# Patient Record
Sex: FEMALE | Race: WHITE | NOT HISPANIC OR LATINO | Employment: FULL TIME | URBAN - METROPOLITAN AREA
[De-identification: names, ages, dates, MRNs, and addresses within clinical notes are randomized per-mention and may not be internally consistent; named-entity substitution may affect disease eponyms.]

---

## 2018-04-03 ENCOUNTER — OFFICE VISIT (OUTPATIENT)
Dept: FAMILY MEDICINE CLINIC | Facility: CLINIC | Age: 43
End: 2018-04-03
Payer: COMMERCIAL

## 2018-04-03 VITALS
HEIGHT: 64 IN | DIASTOLIC BLOOD PRESSURE: 80 MMHG | BODY MASS INDEX: 25.44 KG/M2 | RESPIRATION RATE: 18 BRPM | SYSTOLIC BLOOD PRESSURE: 140 MMHG | HEART RATE: 94 BPM | TEMPERATURE: 98.8 F | WEIGHT: 149 LBS | OXYGEN SATURATION: 95 %

## 2018-04-03 DIAGNOSIS — J02.9 PHARYNGITIS, UNSPECIFIED ETIOLOGY: Primary | ICD-10-CM

## 2018-04-03 PROBLEM — E11.40 NEUROPATHY, DIABETIC (HCC): Status: ACTIVE | Noted: 2018-04-03

## 2018-04-03 PROBLEM — E78.5 HYPERLIPIDEMIA ASSOCIATED WITH TYPE 2 DIABETES MELLITUS (HCC): Status: ACTIVE | Noted: 2018-04-03

## 2018-04-03 PROBLEM — E03.9 ACQUIRED HYPOTHYROIDISM: Status: ACTIVE | Noted: 2018-04-03

## 2018-04-03 PROBLEM — E11.69 HYPERLIPIDEMIA ASSOCIATED WITH TYPE 2 DIABETES MELLITUS (HCC): Status: ACTIVE | Noted: 2018-04-03

## 2018-04-03 PROBLEM — E10.49 TYPE 1 DIABETES MELLITUS WITH NEUROLOGICAL MANIFESTATIONS (HCC): Status: ACTIVE | Noted: 2018-04-03

## 2018-04-03 PROCEDURE — 99213 OFFICE O/P EST LOW 20 MIN: CPT | Performed by: FAMILY MEDICINE

## 2018-04-03 PROCEDURE — 3008F BODY MASS INDEX DOCD: CPT | Performed by: FAMILY MEDICINE

## 2018-04-03 PROCEDURE — 1036F TOBACCO NON-USER: CPT | Performed by: FAMILY MEDICINE

## 2018-04-03 RX ORDER — AMOXICILLIN 500 MG/1
500 CAPSULE ORAL 3 TIMES DAILY
Qty: 30 CAPSULE | Refills: 0 | Status: SHIPPED | OUTPATIENT
Start: 2018-04-03 | End: 2018-04-13

## 2018-04-03 RX ORDER — AMITRIPTYLINE HYDROCHLORIDE 25 MG/1
25 TABLET, FILM COATED ORAL 3 TIMES DAILY
COMMUNITY
Start: 2018-01-08

## 2018-04-03 RX ORDER — BLOOD SUGAR DIAGNOSTIC
STRIP MISCELLANEOUS
COMMUNITY
Start: 2018-02-27

## 2018-04-03 RX ORDER — SIMVASTATIN 5 MG
TABLET ORAL
COMMUNITY
Start: 2018-01-08

## 2018-04-03 RX ORDER — PRAMLINTIDE ACETATE 1000 UG/ML
INJECTION SUBCUTANEOUS
COMMUNITY
Start: 2018-01-08 | End: 2022-01-19

## 2018-04-03 RX ORDER — LEVOTHYROXINE SODIUM 75 MCG
TABLET ORAL
COMMUNITY
Start: 2018-01-08

## 2018-04-03 NOTE — PATIENT INSTRUCTIONS
Send throat swab for rapid strep testing to [facility]   Rx for amoxicillin which can be started today or wait to results of test   Use Tylenol or ibuprofen for pain and fever

## 2018-04-03 NOTE — PROGRESS NOTES
Assessment/Plan:    No problem-specific Assessment & Plan notes found for this encounter  Diagnoses and all orders for this visit:    Pharyngitis, unspecified etiology  -     Rapid Beta strep screen  -     amoxicillin (AMOXIL) 500 mg capsule; Take 1 capsule (500 mg total) by mouth 3 (three) times a day for 10 days please complete entire course of the antibiotic    Other orders  -     simvastatin (ZOCOR) 5 MG tablet;   -     SYMLINPEN 60 1500 MCG/1 5ML SOPN;   -     SYNTHROID 75 MCG tablet;   -     amitriptyline (ELAVIL) 25 mg tablet; Take 25 mg by mouth 3 (three) times a day  -     ONETOUCH VERIO test strip;   -     insulin glargine (LANTUS SOLOSTAR) injection pen 100 units/mL; Inject 0 2 mL (20 Units total) under the skin daily  -     insulin aspart (NovoLOG) 100 units/mL injection; Inject 5 Units under the skin 3 (three) times a day before meals        Patient Instructions   Send throat swab for rapid strep testing to [facility]   Rx for amoxicillin which can be started today or wait to results of test   Use Tylenol or ibuprofen for pain and fever  Subjective:   Chief Complaint   Patient presents with    Sore Throat     cough and congestion, elevated sugars          Patient ID: Alisa Christian is a 43 y o  female  Patient the ill over the last 2-3 days  Chief complaint is or sore throat  She has felt fevers but no documented fevers  Sugars have been elevated since she has been ill  Does have a history of strep throat and diabetes  Sore Throat    Associated symptoms include trouble swallowing  Pertinent negatives include no abdominal pain, coughing, diarrhea, ear pain, shortness of breath or vomiting         The following portions of the patient's history were reviewed and updated as appropriate: allergies, current medications, past family history, past medical history, past social history and problem list     Review of Systems   Constitutional: Negative for appetite change, chills, diaphoresis and fever  HENT: Positive for sore throat, trouble swallowing and voice change  Negative for ear pain, rhinorrhea and sinus pressure  Eyes: Negative for discharge, redness and itching  Respiratory: Negative for cough, shortness of breath and wheezing  Cardiovascular: Negative for chest pain and palpitations  Rapid or slow heart rate   Gastrointestinal: Negative for abdominal pain, diarrhea, nausea and vomiting  Objective:      /80 (BP Location: Left arm, Patient Position: Sitting, Cuff Size: Standard)   Pulse 94   Temp 98 8 °F (37 1 °C) (Oral)   Resp 18   Ht 5' 4" (1 626 m)   Wt 67 6 kg (149 lb)   SpO2 95%   BMI 25 58 kg/m²          Physical Exam   Constitutional: She appears well-developed and well-nourished  No distress  HENT:   Head: Normocephalic and atraumatic  Right Ear: Tympanic membrane and external ear normal  No drainage  Left Ear: Tympanic membrane normal  There is drainage  Nose: Rhinorrhea present  Right sinus exhibits no maxillary sinus tenderness  Left sinus exhibits no maxillary sinus tenderness  Mouth/Throat: Posterior oropharyngeal erythema present  No oropharyngeal exudate  Eyes: Conjunctivae and EOM are normal  Right eye exhibits no discharge  Left eye exhibits no discharge  Neck: Normal range of motion  Neck supple  No thyromegaly present  Cardiovascular: Normal rate, regular rhythm and normal heart sounds  Pulmonary/Chest: Effort normal  No respiratory distress  She has no wheezes  She has no rales  Abdominal: Soft  Lymphadenopathy:     She has no cervical adenopathy  Skin: Skin is warm and dry

## 2018-04-04 ENCOUNTER — TELEPHONE (OUTPATIENT)
Dept: FAMILY MEDICINE CLINIC | Facility: CLINIC | Age: 43
End: 2018-04-04

## 2018-04-05 LAB — Lab: NORMAL

## 2021-04-29 LAB
LEFT EYE DIABETIC RETINOPATHY: NORMAL
RIGHT EYE DIABETIC RETINOPATHY: NORMAL

## 2022-01-03 ENCOUNTER — TELEPHONE (OUTPATIENT)
Dept: FAMILY MEDICINE CLINIC | Facility: CLINIC | Age: 47
End: 2022-01-03

## 2022-01-03 NOTE — TELEPHONE ENCOUNTER
Dolores Samuels Patient  Member ID  [id number]    Date of Birth  [id number]    Gender  Female    Relationship to [address] Name  Judson Yañez    Eligibility Status  Active Coverage    Group Number  [id number]    Plan / Coverage Date  2021-01-01    Transaction Type  Referral    Organization  [facility]    Payer  Melissa Fung ([address])    [employer] logo  Certificate Information  Certification Number  [id number]    Status  CERTIFIED IN TOTAL    Expiration Date  2023-01-03    Member Information  Patient Name  Dolores Samuels    Patient Date of Birth  [id number]    Member ID  [id number]    Relationship to Subscriber  Other Relationship    Subscriber Name  [place], [place]    Requesting Provider     Name  David Campa  [id number]    Provider Role  Provider    Address  [address]  6 Visits  Diagnosis Code 1   - Other general symptoms and signs    Procedure Code 1 (CPT/HCPCS)  43[address]    Status  CERTIFIED IN TOTAL    Rendering Provider/Facility     Provider  Name  [facility]    NPI  [id number]    Provider Role  Service Provider

## 2022-01-19 ENCOUNTER — OFFICE VISIT (OUTPATIENT)
Dept: FAMILY MEDICINE CLINIC | Facility: CLINIC | Age: 47
End: 2022-01-19
Payer: COMMERCIAL

## 2022-01-19 VITALS
DIASTOLIC BLOOD PRESSURE: 88 MMHG | HEART RATE: 85 BPM | BODY MASS INDEX: 21.79 KG/M2 | SYSTOLIC BLOOD PRESSURE: 138 MMHG | WEIGHT: 123 LBS | RESPIRATION RATE: 16 BRPM | HEIGHT: 63 IN | TEMPERATURE: 98.7 F

## 2022-01-19 DIAGNOSIS — Z12.31 ENCOUNTER FOR SCREENING MAMMOGRAM FOR BREAST CANCER: Primary | ICD-10-CM

## 2022-01-19 DIAGNOSIS — Z00.00 WELLNESS EXAMINATION: ICD-10-CM

## 2022-01-19 DIAGNOSIS — E03.9 ACQUIRED HYPOTHYROIDISM: ICD-10-CM

## 2022-01-19 DIAGNOSIS — E10.40 TYPE 1 DIABETES MELLITUS WITH DIABETIC NEUROPATHY (HCC): ICD-10-CM

## 2022-01-19 PROCEDURE — 3008F BODY MASS INDEX DOCD: CPT | Performed by: FAMILY MEDICINE

## 2022-01-19 PROCEDURE — 99386 PREV VISIT NEW AGE 40-64: CPT | Performed by: FAMILY MEDICINE

## 2022-01-19 PROCEDURE — 3725F SCREEN DEPRESSION PERFORMED: CPT | Performed by: FAMILY MEDICINE

## 2022-01-19 PROCEDURE — 1036F TOBACCO NON-USER: CPT | Performed by: FAMILY MEDICINE

## 2022-01-19 RX ORDER — SEMAGLUTIDE 1.34 MG/ML
INJECTION, SOLUTION SUBCUTANEOUS
COMMUNITY
Start: 2022-01-03

## 2022-01-19 RX ORDER — INSULIN ADMIN. SUPPLIES
INSULIN PEN (EA) SUBCUTANEOUS
COMMUNITY
Start: 2022-01-10

## 2022-01-19 RX ORDER — BLOOD-GLUCOSE TRANSMITTER
EACH MISCELLANEOUS
COMMUNITY
Start: 2022-01-10

## 2022-01-19 RX ORDER — BLOOD-GLUCOSE SENSOR
EACH MISCELLANEOUS
COMMUNITY
Start: 2022-01-10

## 2022-01-19 NOTE — PROGRESS NOTES
HPI:  Flor Elizalde is a 55 y o  female here for her yearly health maintenance exam    Patient Active Problem List   Diagnosis    Type 1 diabetes mellitus with neurological manifestations ([facility] )    Neuropathy, diabetic ([facility] )    Hyperlipidemia associated with type 2 diabetes mellitus ([facility] )    Acquired hypothyroidism     History reviewed  No pertinent past medical history  1  Advanced Directive: n     2  Durable Power of  for Healthcare: n     3  Social History:           Drug and alcohol History: n                  4  Immunizations up to date: y                 Lifestyle:                           Healthy Diet:y                          Alcohol Use:n                          Tobacco Use:n                          Regular exercise:y                          Weight concerns:n                               5  Over the past 2 weeks, how often have you been bothered by the following:              Little interest or pleasure in doing things:n              Felling down, depressed or hopeless:n       Current Outpatient Medications   Medication Sig Dispense Refill    amitriptyline (ELAVIL) 25 mg tablet Take 25 mg by mouth 3 (three) times a day      Continuous Blood Gluc Sensor (Dexcom G6 Sensor) MISC       Continuous Blood Gluc Transmit (Dexcom G6 Transmitter) MISC       insulin aspart (NovoLOG) 100 units/mL injection Inject 5 Units under the skin 3 (three) times a day before meals 3 Units 0    insulin glargine (LANTUS SOLOSTAR) injection pen 100 units/mL Inject 0 2 mL (20 Units total) under the skin daily 5 pen 0    NovoPen Echo JEANNETTE       ONETOUCH VERIO test strip       Ozempic, 1 MG/DOSE, 4 MG/3ML SOPN injection pen       simvastatin (ZOCOR) 5 MG tablet       SYMLINPEN 60 1500 MCG/1 5ML SOPN       SYNTHROID 75 MCG tablet        No current facility-administered medications for this visit       No Known Allergies  Immunization History   Administered Date(s) Administered    COVID-19 [employer]) vaccine 0 5 mL 11/14/2021       Patient Care Team:  Mychal Gonzalez MD as PCP - General (Family Medicine)    Review of Systems   Constitutional: Negative for fatigue, fever and unexpected weight change  HENT: Negative for congestion, sinus pain and sore throat  Eyes: Negative for visual disturbance  Respiratory: Negative for shortness of breath and wheezing  Cardiovascular: Negative for chest pain and palpitations  Gastrointestinal: Negative for abdominal pain, nausea and vomiting  Musculoskeletal: Negative  Negative for arthralgias and myalgias  Neurological: Negative for syncope, weakness and numbness  Psychiatric/Behavioral: Negative  Negative for confusion, dysphoric mood and suicidal ideas   diab  Physical Exam :  Physical Exam  Constitutional:       Appearance: She is well-developed  HENT:      Right Ear: Ear canal normal  Tympanic membrane is not injected  Left Ear: Ear canal normal  Tympanic membrane is not injected  Nose: Nose normal    Eyes:      General:         Right eye: No discharge  Left eye: No discharge  Conjunctiva/sclera: Conjunctivae normal       Pupils: Pupils are equal, round, and reactive to light  Neck:      Thyroid: No thyromegaly  Cardiovascular:      Rate and Rhythm: Normal rate and regular rhythm  Pulses: no weak pulses          Dorsalis pedis pulses are 2+ on the right side and 2+ on the left side  Posterior tibial pulses are 2+ on the right side and 2+ on the left side  Heart sounds: Normal heart sounds  No murmur heard  Pulmonary:      Effort: Pulmonary effort is normal  No respiratory distress  Breath sounds: Normal breath sounds  No wheezing  Abdominal:      General: Bowel sounds are normal  There is no distension  Palpations: Abdomen is soft  Tenderness: There is no abdominal tenderness  Musculoskeletal:         General: Normal range of motion        Cervical back: Normal range of motion and neck supple  Feet:      Right foot:      Skin integrity: No ulcer, skin breakdown, erythema, warmth, callus or dry skin  Left foot:      Skin integrity: No ulcer, skin breakdown, erythema, warmth, callus or dry skin  Lymphadenopathy:      Cervical: No cervical adenopathy  Skin:     General: Skin is warm and dry  Neurological:      Mental Status: She is alert and oriented to person, place, and time  She is not disoriented  Sensory: No sensory deficit  Gait: Gait normal       Deep Tendon Reflexes: Reflexes are normal and symmetric  Psychiatric:         Speech: Speech normal          Behavior: Behavior normal          Thought Content: Thought content normal          Judgment: Judgment normal        Patient's shoes and socks removed  Right Foot/Ankle   Right Foot Inspection  Skin Exam: skin normal and skin intact  No dry skin, no warmth, no callus, no erythema, no maceration, no abnormal color, no pre-ulcer, no ulcer and no callus  Toe Exam: ROM and strength within normal limits  Sensory   Vibration: intact  Proprioception: intact  Monofilament testing: intact    Vascular  Capillary refills: < 3 seconds  The right DP pulse is 2+  The right PT pulse is 2+  Left Foot/Ankle  Left Foot Inspection  Skin Exam: skin normal and skin intact  No dry skin, no warmth, no erythema, no maceration, normal color, no pre-ulcer, no ulcer and no callus  Toe Exam: ROM and strength within normal limits  Sensory   Vibration: intact  Proprioception: intact  Monofilament testing: intact    Vascular  Capillary refills: < 3 seconds  The left DP pulse is 2+  The left PT pulse is 2+  Assign Risk Category  No deformity present  No loss of protective sensation  No weak pulses  Risk: 0        Assessment and Plan:  1  Encounter for screening mammogram for breast cancer  Mammo screening bilateral w 3d & cad   2  Wellness examination     3  Type 1 diabetes mellitus with diabetic neuropathy (HCC)     4  Acquired hypothyroidism         Health Maintenance Due   Topic Date Due    Hepatitis C Screening  Never done    Pneumococcal Vaccine: Pediatrics (0 to 5 Years) and At-Risk Patients (6 to 59 Years) (1 of 2 - PPSV23) Never done    Diabetic Foot Exam  Never done    DM Eye Exam  Never done    URINE MICROALBUMIN  Never done    HIV Screening  Never done    Annual Physical  Never done    DTaP,Tdap,and Td Vaccines (1 - Tdap) Never done    Cervical Cancer Screening  Never done    Breast Cancer Screening: Mammogram  Never done    Influenza Vaccine (1) Never done    COVID-19 Vaccine (2 - Booster for Corhythm series) 01/09/2022

## 2022-07-20 ENCOUNTER — OFFICE VISIT (OUTPATIENT)
Dept: FAMILY MEDICINE CLINIC | Facility: CLINIC | Age: 47
End: 2022-07-20
Payer: COMMERCIAL

## 2022-07-20 VITALS
DIASTOLIC BLOOD PRESSURE: 72 MMHG | TEMPERATURE: 98.2 F | WEIGHT: 116 LBS | BODY MASS INDEX: 20.55 KG/M2 | HEART RATE: 86 BPM | SYSTOLIC BLOOD PRESSURE: 120 MMHG | HEIGHT: 63 IN

## 2022-07-20 DIAGNOSIS — M25.571 RIGHT ANKLE PAIN, UNSPECIFIED CHRONICITY: Primary | ICD-10-CM

## 2022-07-20 PROCEDURE — 3725F SCREEN DEPRESSION PERFORMED: CPT | Performed by: FAMILY MEDICINE

## 2022-07-20 PROCEDURE — 99214 OFFICE O/P EST MOD 30 MIN: CPT | Performed by: FAMILY MEDICINE

## 2022-07-20 NOTE — PROGRESS NOTES
Assessment/Plan:    No problem-specific Assessment & Plan notes found for this encounter  Diagnoses and all orders for this visit:    Right ankle pain, unspecified chronicity          Subjective:   Chief Complaint   Patient presents with    Ankle Pain     Right          Patient ID: Tomasa Munguia is a 52 y o  female  R ankle pain s/p Fx 20 yrs ago    Ankle Pain   Pertinent negatives include no numbness  The following portions of the patient's history were reviewed and updated as appropriate: allergies, current medications, past family history, past medical history, past social history, past surgical history and problem list     Review of Systems   Constitutional: Negative for fatigue, fever and unexpected weight change  HENT: Negative for congestion, sinus pain and sore throat  Eyes: Negative for visual disturbance  Respiratory: Negative for shortness of breath and wheezing  Cardiovascular: Negative for chest pain and palpitations  Gastrointestinal: Negative for abdominal pain, nausea and vomiting  Musculoskeletal: Negative  Negative for arthralgias and myalgias  Neurological: Negative for syncope, weakness and numbness  Psychiatric/Behavioral: Negative  Negative for confusion, dysphoric mood and suicidal ideas  Objective:  Vitals:    07/20/22 1420   BP: 120/72   BP Location: Left arm   Patient Position: Sitting   Cuff Size: Adult   Pulse: 86   Temp: 98 2 °F (36 8 °C)   TempSrc: Oral   Weight: 52 6 kg (116 lb)   Height: 5' 3" (1 6 m)      Physical Exam  Constitutional:       Appearance: She is well-developed  HENT:      Right Ear: Ear canal normal  Tympanic membrane is not injected  Left Ear: Ear canal normal  Tympanic membrane is not injected  Nose: Nose normal    Eyes:      General:         Right eye: No discharge  Left eye: No discharge  Conjunctiva/sclera: Conjunctivae normal       Pupils: Pupils are equal, round, and reactive to light     Neck: Thyroid: No thyromegaly  Cardiovascular:      Rate and Rhythm: Normal rate and regular rhythm  Heart sounds: Normal heart sounds  No murmur heard  Pulmonary:      Effort: Pulmonary effort is normal  No respiratory distress  Breath sounds: Normal breath sounds  No wheezing  Abdominal:      General: Bowel sounds are normal  There is no distension  Palpations: Abdomen is soft  Tenderness: There is no abdominal tenderness  Musculoskeletal:         General: Normal range of motion  Cervical back: Normal range of motion and neck supple  Lymphadenopathy:      Cervical: No cervical adenopathy  Skin:     General: Skin is warm and dry  Neurological:      Mental Status: She is alert and oriented to person, place, and time  She is not disoriented  Sensory: No sensory deficit  Gait: Gait normal       Deep Tendon Reflexes: Reflexes are normal and symmetric  Psychiatric:         Speech: Speech normal          Behavior: Behavior normal          Thought Content:  Thought content normal          Judgment: Judgment normal

## 2022-10-03 ENCOUNTER — OFFICE VISIT (OUTPATIENT)
Dept: URGENT CARE | Facility: CLINIC | Age: 47
End: 2022-10-03
Payer: COMMERCIAL

## 2022-10-03 VITALS
RESPIRATION RATE: 18 BRPM | TEMPERATURE: 97.5 F | HEART RATE: 101 BPM | DIASTOLIC BLOOD PRESSURE: 77 MMHG | BODY MASS INDEX: 20.89 KG/M2 | OXYGEN SATURATION: 98 % | SYSTOLIC BLOOD PRESSURE: 150 MMHG | WEIGHT: 117.9 LBS | HEIGHT: 63 IN

## 2022-10-03 DIAGNOSIS — S01.81XA CHIN LACERATION, INITIAL ENCOUNTER: Primary | ICD-10-CM

## 2022-10-03 PROCEDURE — 12011 RPR F/E/E/N/L/M 2.5 CM/<: CPT | Performed by: PHYSICIAN ASSISTANT

## 2022-10-03 PROCEDURE — 90471 IMMUNIZATION ADMIN: CPT | Performed by: PHYSICIAN ASSISTANT

## 2022-10-03 PROCEDURE — G0382 LEV 3 HOSP TYPE B ED VISIT: HCPCS

## 2022-10-03 PROCEDURE — 90715 TDAP VACCINE 7 YRS/> IM: CPT

## 2022-10-03 RX ORDER — GINSENG 100 MG
1 CAPSULE ORAL ONCE
Status: COMPLETED | OUTPATIENT
Start: 2022-10-03 | End: 2022-10-03

## 2022-10-03 RX ORDER — CEPHALEXIN 500 MG/1
500 CAPSULE ORAL EVERY 8 HOURS SCHEDULED
Qty: 15 CAPSULE | Refills: 0 | Status: SHIPPED | OUTPATIENT
Start: 2022-10-03 | End: 2022-10-08

## 2022-10-03 RX ADMIN — Medication 1 SMALL APPLICATION: at 13:20

## 2022-10-03 NOTE — PROGRESS NOTES
NAME: Jerri Mario is a 52 y o  female  : 1975    MRN: [id number]      Assessment and Plan   Chin laceration, initial encounter Lenin Liz  1  Chin laceration, initial encounter  Laceration repair    Tdap Vaccine greater than or equal to 6yo    cephalexin (KEFLEX) 500 mg capsule    bacitracin topical ointment 1 small application      laceration repaired-discussed with patient small area of avulsion as well and that area is not repairable  Discussed wound care, will place on Keflex given type 1 diabetes and suture removal in 5-7 days  Follow-up sooner for signs of infection  She acknowledges    Tetanus updated in clinic      Patient Instructions     Patient Instructions   Keflex as directed  Tetanus updated today in clinic  Keep clean and dry   Antibiotic ointment daily   Covered while out and about- air out at home   Follow up for signs of infection   Suture removal in 5-7 days         Proceed to ER if symptoms worsen  Chief Complaint     Chief Complaint   Patient presents with    chin laceration     Pt reports falling and landing chin-first on concrete walkway at [employer]-denies fainting/LOC/dizziness before/during/after fall-says she tripped-not on blood thinners- last tet unknown-3/10 achey pain-is covid pos x 5d         History of Present Illness   Patient with history of type 1 diabetes presents with has been complaining of laceration to her chin sustained prior to arrival   Reports she was walking and caught her foot on uneven pavement and fell forward  Reports her hands were in her pockets and only able to catch herself at the last second, hitting her chin on the curb  Denies loss of consciousness but does have a small laceration to her chin  Denies any vision changes, dizziness, lightheadedness, vomiting, neck pain or stiffness  Reports minimal pain in the area and states I feel like I just have a scrape    Unsure of when her last tetanus shot was        Review of Systems Review of Systems   Constitutional: Negative for chills and fever  Eyes: Negative for photophobia and visual disturbance  Gastrointestinal: Negative for nausea and vomiting  Musculoskeletal: Negative for neck pain and neck stiffness  Skin: Positive for wound  Neurological: Negative for dizziness, tremors, seizures, syncope, weakness, light-headedness, numbness and headaches  Current Medications       Current Outpatient Medications:     amitriptyline (ELAVIL) 25 mg tablet, Take 25 mg by mouth 3 (three) times a day, Disp: , Rfl:     cephalexin (KEFLEX) 500 mg capsule, Take 1 capsule (500 mg total) by mouth every 8 (eight) hours for 5 days, Disp: 15 capsule, Rfl: 0    Continuous Blood Gluc Sensor (Dexcom G6 Sensor) MISC, , Disp: , Rfl:     Continuous Blood Gluc Transmit (Dexcom G6 Transmitter) MISC, , Disp: , Rfl:     insulin aspart (NovoLOG) 100 units/mL injection, Inject 5 Units under the skin 3 (three) times a day before meals, Disp: 3 Units, Rfl: 0    insulin glargine (LANTUS SOLOSTAR) injection pen 100 units/mL, Inject 0 2 mL (20 Units total) under the skin daily, Disp: 5 pen, Rfl: 0    NovoPen Echo JEANNETTE, , Disp: , Rfl:     ONETOUCH VERIO test strip, , Disp: , Rfl:     Ozempic, 1 MG/DOSE, 4 MG/3ML SOPN injection pen, , Disp: , Rfl:     simvastatin (ZOCOR) 5 MG tablet, , Disp: , Rfl:     SYNTHROID 75 MCG tablet, , Disp: , Rfl:     Current Facility-Administered Medications:     bacitracin topical ointment 1 small application, 1 small application, Topical, Once, Ana Bar PA-C    Current Allergies     Allergies as of 10/03/2022    (No Known Allergies)              Past Medical History:   Diagnosis Date    Diabetes mellitus ([facility] [address] )        History reviewed  No pertinent surgical history      Family History   Problem Relation Age of Onset    Hypertension Maternal Grandmother     Diabetes Cousin     Substance Abuse Neg Hx     Mental illness Neg Hx          Medications have been verified  The following portions of the patient's history were reviewed and updated as appropriate: allergies, current medications, past family history, past medical history, past social history, past surgical history and problem list     Objective   /77   Pulse 101   Temp 97 5 °F (36 4 °C)   Resp 18   Ht 5' 3" (1 6 m)   Wt 53 5 kg (117 lb 14 4 oz)   SpO2 98%   BMI 20 89 kg/m²      Laceration repair    Date/Time: 10/3/2022 12:26 PM  Performed by: Paco Hanonn PA-C  Authorized by: Paco Hannon PA-C   Consent: Verbal consent obtained  Risks and benefits: risks, benefits and alternatives were discussed  Consent given by: patient  Patient understanding: patient states understanding of the procedure being performed  Patient identity confirmed: verbally with patient  Time out: Immediately prior to procedure a "time out" was called to verify the correct patient, procedure, equipment, support staff and site/side marked as required  Body area: head/neck  Location details: chin  Laceration length: 1 5 cm  Foreign bodies: no foreign bodies  Anesthesia: local infiltration    Anesthesia:  Local Anesthetic: lidocaine 1% without epinephrine  Anesthetic total: 2 mL      Procedure Details:  Preparation: Patient was prepped and draped in the usual sterile fashion  Irrigation solution: saline  Irrigation method: jet lavage  Amount of cleaning: standard  Debridement: minimal  Skin closure: Ethilon (5-0)  Number of sutures: 3  Technique: simple  Approximation: close  Approximation difficulty: simple  Dressing: antibiotic ointment  Patient tolerance: patient tolerated the procedure well with no immediate complications           Physical Exam     Physical Exam  Vitals and nursing note reviewed  Constitutional:       General: She is not in acute distress  Appearance: Normal appearance  She is not ill-appearing, toxic-appearing or diaphoretic  HENT:      Head:      Comments: Teeth are intact without any damage  No bleeding mouth  Eyes:      Extraocular Movements: Extraocular movements intact  Pupils: Pupils are equal, round, and reactive to light  Neck:      Comments: No tenderness in the midline of the bilateral paravertebral muscles  Full AROM without pain or restriction  Cardiovascular:      Rate and Rhythm: Normal rate and regular rhythm  Pulmonary:      Effort: Pulmonary effort is normal  No respiratory distress  Musculoskeletal:      Cervical back: Normal range of motion  No rigidity or tenderness  Skin:     General: Skin is warm  Capillary Refill: Capillary refill takes less than 2 seconds  Comments: 1 5 cm jagged laceration barely extending through the dermis to the anterior/inferior chin without any visible foreign bodies with adjacent avulsion  No surrounding edema  No active bleeding  No tenderness to palpation  Full open and close the jaw without pain or restriction  Neurological:      General: No focal deficit present  Mental Status: She is alert and oriented to person, place, and time  Motor: No weakness  Psychiatric:         Mood and Affect: Mood normal          Thought Content:  Thought content normal

## 2022-10-03 NOTE — PATIENT INSTRUCTIONS
Keflex as directed  Tetanus updated today in clinic  Keep clean and dry   Antibiotic ointment daily   Covered while out and about- air out at home   Follow up for signs of infection   Suture removal in 5-7 days

## 2022-10-07 ENCOUNTER — OFFICE VISIT (OUTPATIENT)
Dept: FAMILY MEDICINE CLINIC | Facility: CLINIC | Age: 47
End: 2022-10-07
Payer: COMMERCIAL

## 2022-10-07 VITALS
SYSTOLIC BLOOD PRESSURE: 122 MMHG | BODY MASS INDEX: 20.55 KG/M2 | RESPIRATION RATE: 16 BRPM | HEART RATE: 88 BPM | WEIGHT: 116 LBS | OXYGEN SATURATION: 99 % | HEIGHT: 63 IN | DIASTOLIC BLOOD PRESSURE: 80 MMHG

## 2022-10-07 DIAGNOSIS — Z48.02 VISIT FOR SUTURE REMOVAL: Primary | ICD-10-CM

## 2022-10-07 PROCEDURE — 99212 OFFICE O/P EST SF 10 MIN: CPT | Performed by: FAMILY MEDICINE

## 2022-10-07 NOTE — PROGRESS NOTES
[id number]         NAME: Roxana Gruber is a 52 y o  female  : 1975    MRN: [id number]  DATE: 2022  TIME: 3:52 PM    Assessment and Plan   Visit for suture removal [Z48 02]  1  Visit for suture removal         No problem-specific Assessment & Plan notes found for this encounter  Patient Instructions     Patient Instructions   Ready to keep Steri-Strips in place  Careful washing  Finish out antibiotic  Chief Complaint     Chief Complaint   Patient presents with    Suture / Staple Removal         History of Present Illness       PATIENT HERE FOR SUTURE REMOVAL  Laceration of the chin  3 sutures visible  There were placed 10/ 3  Review of Systems   Review of Systems   Skin: Positive for wound  Negative for color change and rash           Current Medications       Current Outpatient Medications:     amitriptyline (ELAVIL) 25 mg tablet, Take 25 mg by mouth 3 (three) times a day, Disp: , Rfl:     cephalexin (KEFLEX) 500 mg capsule, Take 1 capsule (500 mg total) by mouth every 8 (eight) hours for 5 days, Disp: 15 capsule, Rfl: 0    Continuous Blood Gluc Sensor (Dexcom G6 Sensor) MISC, , Disp: , Rfl:     Continuous Blood Gluc Transmit (Dexcom G6 Transmitter) MISC, , Disp: , Rfl:     insulin aspart (NovoLOG) 100 units/mL injection, Inject 5 Units under the skin 3 (three) times a day before meals, Disp: 3 Units, Rfl: 0    insulin glargine (LANTUS SOLOSTAR) injection pen 100 units/mL, Inject 0 2 mL (20 Units total) under the skin daily, Disp: 5 pen, Rfl: 0    NovoPen Echo JEANNETTE, , Disp: , Rfl:     ONETOUCH VERIO test strip, , Disp: , Rfl:     Ozempic, 1 MG/DOSE, 4 MG/3ML SOPN injection pen, , Disp: , Rfl:     simvastatin (ZOCOR) 5 MG tablet, , Disp: , Rfl:     SYNTHROID 75 MCG tablet, , Disp: , Rfl:     Current Allergies     Allergies as of 10/07/2022    (No Known Allergies)            The following portions of the patient's history were reviewed and updated as appropriate: allergies, current medications, past family history, past medical history, past social history, past surgical history and problem list      Past Medical History:   Diagnosis Date    Diabetes mellitus ([facility] [address] )        History reviewed  No pertinent surgical history  Family History   Problem Relation Age of Onset    Hypertension Maternal Grandmother     Diabetes Cousin     Substance Abuse Neg Hx     Mental illness Neg Hx          Medications have been verified  Objective   /80   Pulse 88   Resp 16   Ht 5' 3" (1 6 m)   Wt 52 6 kg (116 lb)   LMP 09/14/2022   SpO2 99%   BMI 20 55 kg/m²        Physical Exam     Physical Exam  Constitutional:       Appearance: Normal appearance  HENT:      Head: Normocephalic  Skin:     Comments: Approximately 1 cm laceration  Some abrasion  Shows good healing  3 sutures removed without issue  Steri-Strips placed fracture support for the next 2-3 days  Neurological:      Mental Status: She is alert

## 2023-02-14 LAB
LEFT EYE DIABETIC RETINOPATHY: NORMAL
RIGHT EYE DIABETIC RETINOPATHY: NORMAL
SEVERITY (EYE EXAM): NORMAL

## 2023-03-01 ENCOUNTER — OFFICE VISIT (OUTPATIENT)
Dept: FAMILY MEDICINE CLINIC | Facility: CLINIC | Age: 48
End: 2023-03-01

## 2023-03-01 VITALS
OXYGEN SATURATION: 100 % | TEMPERATURE: 98.6 F | SYSTOLIC BLOOD PRESSURE: 124 MMHG | DIASTOLIC BLOOD PRESSURE: 71 MMHG | BODY MASS INDEX: 20.73 KG/M2 | WEIGHT: 117 LBS | HEART RATE: 85 BPM

## 2023-03-01 DIAGNOSIS — E10.40 TYPE 1 DIABETES MELLITUS WITH DIABETIC NEUROPATHY (HCC): ICD-10-CM

## 2023-03-01 DIAGNOSIS — Z00.00 ANNUAL PHYSICAL EXAM: ICD-10-CM

## 2023-03-01 DIAGNOSIS — G44.201 ACUTE INTRACTABLE TENSION-TYPE HEADACHE: Primary | ICD-10-CM

## 2023-03-01 DIAGNOSIS — Z12.31 SCREENING MAMMOGRAM, ENCOUNTER FOR: ICD-10-CM

## 2023-03-01 DIAGNOSIS — E78.5 HYPERLIPIDEMIA ASSOCIATED WITH TYPE 2 DIABETES MELLITUS (HCC): ICD-10-CM

## 2023-03-01 DIAGNOSIS — E11.69 HYPERLIPIDEMIA ASSOCIATED WITH TYPE 2 DIABETES MELLITUS (HCC): ICD-10-CM

## 2023-03-01 DIAGNOSIS — E03.9 ACQUIRED HYPOTHYROIDISM: ICD-10-CM

## 2023-03-01 RX ORDER — KETOROLAC TROMETHAMINE 10 MG/1
10 TABLET, FILM COATED ORAL EVERY 6 HOURS PRN
Qty: 12 TABLET | Refills: 0 | Status: SHIPPED | OUTPATIENT
Start: 2023-03-01

## 2023-03-01 NOTE — PATIENT INSTRUCTIONS
Toradol as prescribed for intractable headache  Go to ED if pain severe  Call with problems/concerns    Wellness Visit for Adults   AMBULATORY CARE:   A wellness visit  is when you see your healthcare provider to get screened for health problems  Your healthcare provider will also give you advice on how to stay healthy  Write down your questions so you remember to ask them  Ask your healthcare provider how often you should have a wellness visit  What happens at a wellness visit:  Your healthcare provider will ask about your health, and your family history of health problems  This includes high blood pressure, heart disease, and cancer  He or she will ask if you have symptoms that concern you, if you smoke, and about your mood  You may also be asked about your intake of medicines, supplements, food, and alcohol  Any of the following may be done: Your weight  will be checked  Your height may also be checked so your body mass index (BMI) can be calculated  Your BMI shows if you are at a healthy weight  Your blood pressure  and heart rate will be checked  Your temperature may also be checked  Blood and urine tests  may be done  Blood tests may be done to check your cholesterol levels  Abnormal cholesterol levels increase your risk for heart disease and stroke  You may also need a blood or urine test to check for diabetes if you are at increased risk  Urine tests may be done to look for signs of an infection or kidney disease  A physical exam  includes checking your heartbeat and lungs with a stethoscope  Your healthcare provider may also check your skin to look for sun damage  Screening tests  may be recommended  A screening test is done to check for diseases that may not cause symptoms  The screening tests you may need depend on your age, gender, family history, and lifestyle habits  For example, colorectal screening may be recommended if you are 48years old or older      Screening tests you need if you are a woman:   A Pap smear  is used to screen for cervical cancer  Pap smears are usually done every 3 to 5 years depending on your age  You may need them more often if you have had abnormal Pap smear test results in the past  Ask your healthcare provider how often you should have a Pap smear  A mammogram  is an x-ray of your breasts to screen for breast cancer  Experts recommend mammograms every 2 years starting at age 48 years  You may need a mammogram at age 52 years or younger if you have an increased risk for breast cancer  Talk to your healthcare provider about when you should start having mammograms and how often you need them  Vaccines you may need:   Get an influenza vaccine  every year  The influenza vaccine protects you from the flu  Several types of viruses cause the flu  The viruses change over time, so new vaccines are made each year  Get a tetanus-diphtheria (Td) booster vaccine  every 10 years  This vaccine protects you against tetanus and diphtheria  Tetanus is a severe infection that may cause painful muscle spasms and lockjaw  Diphtheria is a severe bacterial infection that causes a thick covering in the back of your mouth and throat  Get a human papillomavirus (HPV) vaccine  if you are female and aged 23 to 32 or male 23 to 24 and never received it  This vaccine protects you from HPV infection  HPV is the most common infection spread by sexual contact  HPV may also cause vaginal, penile, and anal cancers  Get a pneumococcal vaccine  if you are aged 72 years or older  The pneumococcal vaccine is an injection given to protect you from pneumococcal disease  Pneumococcal disease is an infection caused by pneumococcal bacteria  The infection may cause pneumonia, meningitis, or an ear infection  Get a shingles vaccine  if you are 60 or older, even if you have had shingles before  The shingles vaccine is an injection to protect you from the varicella-zoster virus   This is the same virus that causes chickenpox  Shingles is a painful rash that develops in people who had chickenpox or have been exposed to the virus  How to eat healthy:  My Plate is a model for planning healthy meals  It shows the types and amounts of foods that should go on your plate  Fruits and vegetables make up about half of your plate, and grains and protein make up the other half  A serving of dairy is included on the side of your plate  The amount of calories and serving sizes you need depends on your age, gender, weight, and height  Examples of healthy foods are listed below:  Eat a variety of vegetables  such as dark green, red, and orange vegetables  You can also include canned vegetables low in sodium (salt) and frozen vegetables without added butter or sauces  Eat a variety of fresh fruits , canned fruit in 100% juice, frozen fruit, and dried fruit  Include whole grains  At least half of the grains you eat should be whole grains  Examples include whole-wheat bread, wheat pasta, brown rice, and whole-grain cereals such as oatmeal     Eat a variety of protein foods such as seafood (fish and shellfish), lean meat, and poultry without skin (turkey and chicken)  Examples of lean meats include pork leg, shoulder, or tenderloin, and beef round, sirloin, tenderloin, and extra lean ground beef  Other protein foods include eggs and egg substitutes, beans, peas, soy products, nuts, and seeds  Choose low-fat dairy products such as skim or 1% milk or low-fat yogurt, cheese, and cottage cheese  Limit unhealthy fats  such as butter, hard margarine, and shortening  Exercise:  Exercise at least 30 minutes per day on most days of the week  Some examples of exercise include walking, biking, dancing, and swimming  You can also fit in more physical activity by taking the stairs instead of the elevator or parking farther away from stores  Include muscle strengthening activities 2 days each week   Regular exercise provides many health benefits  It helps you manage your weight, and decreases your risk for type 2 diabetes, heart disease, stroke, and high blood pressure  Exercise can also help improve your mood  Ask your healthcare provider about the best exercise plan for you  General health and safety guidelines:   Do not smoke  Nicotine and other chemicals in cigarettes and cigars can cause lung damage  Ask your healthcare provider for information if you currently smoke and need help to quit  E-cigarettes or smokeless tobacco still contain nicotine  Talk to your healthcare provider before you use these products  Limit alcohol  A drink of alcohol is 12 ounces of beer, 5 ounces of wine, or 1½ ounces of liquor  Lose weight, if needed  Being overweight increases your risk of certain health conditions  These include heart disease, high blood pressure, type 2 diabetes, and certain types of cancer  Protect your skin  Do not sunbathe or use tanning beds  Use sunscreen with a SPF 15 or higher  Apply sunscreen at least 15 minutes before you go outside  Reapply sunscreen every 2 hours  Wear protective clothing, hats, and sunglasses when you are outside  Drive safely  Always wear your seatbelt  Make sure everyone in your car wears a seatbelt  A seatbelt can save your life if you are in an accident  Do not use your cell phone when you are driving  This could distract you and cause an accident  Pull over if you need to make a call or send a text message  Practice safe sex  Use latex condoms if are sexually active and have more than one partner  Your healthcare provider may recommend screening tests for sexually transmitted infections (STIs)  Wear helmets, lifejackets, and protective gear  Always wear a helmet when you ride a bike or motorcycle, go skiing, or play sports that could cause a head injury  Wear protective equipment when you play sports   Wear a lifejacket when you are on a boat or doing water sports  © Copyright [employer] 2022 Information is for End User's use only and may not be sold, redistributed or otherwise used for commercial purposes  The above information is an  only  It is not intended as medical advice for individual conditions or treatments  Talk to your doctor, nurse or pharmacist before following any medical regimen to see if it is safe and effective for you

## 2023-03-01 NOTE — PROGRESS NOTES
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 ([address] )    Hyperlipidemia associated with type 2 diabetes mellitus ([address] )    Acquired hypothyroidism   Other Visit Diagnoses     Acute intractable tension-type headache    -  Primary    Relevant Medications    ketorolac (TORADOL) 10 mg tablet    Screening mammogram, encounter for        Relevant Orders    Mammo screening bilateral w 3d & cad    Annual physical exam              Immunizations and preventive care screenings were discussed with patient today  Appropriate education was printed on patient's after visit summary  Counseling:  Alcohol/drug use: discussed moderation in alcohol intake, the recommendations for healthy alcohol use, and avoidance of illicit drug use  Dental Health: discussed importance of regular tooth brushing, flossing, and dental visits  Injury prevention: discussed safety/seat belts, safety helmets, smoke detectors, carbon dioxide detectors, and smoking near bedding or upholstery  Sexual health: discussed sexually transmitted diseases, partner selection, use of condoms, avoidance of unintended pregnancy, and contraceptive alternatives  · Exercise: the importance of regular exercise/physical activity was discussed  Recommend exercise 3-5 times per week for at least 30 minutes  Depression Screening and Follow-up Plan: Patient was screened for depression during today's encounter  They screened negative with a PHQ-2 score of 0  No follow-ups on file       Chief Complaint:     Chief Complaint   Patient presents with   • Headache     Severe for past couple of days   • Physical Exam      History of Present Illness:     Adult Annual Physical   Patient here for a comprehensive physical exam  The patient reports problems - severe HA  Diet and Physical Activity  · Diet/Nutrition: well balanced diet  · Exercise: no formal exercise  Depression Screening  PHQ-2/9 Depression Screening    Little interest or pleasure in doing things: 0 - not at all  Feeling down, depressed, or hopeless: 0 - not at all  PHQ-2 Score: 0  PHQ-2 Interpretation: Negative depression screen       General Health  · Sleep: sleeps well  · Hearing: normal - bilateral   · Vision: goes for regular eye exams  · Dental: regular dental visits  /GYN Health  · Patient is: perimenopausal  · Last menstrual period: 2/22/23  · Contraceptive method: none  Review of Systems:     Review of Systems   Constitutional: Negative for activity change, appetite change, chills, diaphoresis, fatigue, fever and unexpected weight change  HENT: Negative for congestion, dental problem, drooling, ear discharge, ear pain, facial swelling, hearing loss, mouth sores, nosebleeds, postnasal drip, rhinorrhea, sinus pressure, sinus pain, sneezing, sore throat, tinnitus, trouble swallowing and voice change  Eyes: Negative for photophobia, pain, discharge, redness, itching and visual disturbance  Respiratory: Negative for apnea, cough, choking, chest tightness, shortness of breath, wheezing and stridor  Cardiovascular: Negative for chest pain, palpitations and leg swelling  Gastrointestinal: Negative for abdominal distention, abdominal pain, anal bleeding, blood in stool, constipation, diarrhea, nausea, rectal pain and vomiting  Endocrine: Negative for cold intolerance, heat intolerance, polydipsia, polyphagia and polyuria  Genitourinary: Negative for decreased urine volume, difficulty urinating, dysuria, flank pain, frequency, hematuria, menstrual problem, pelvic pain, urgency, vaginal bleeding, vaginal discharge and vaginal pain     Musculoskeletal: Negative for arthralgias, back pain, gait problem, joint swelling, myalgias, neck pain and neck stiffness  Skin: Negative for color change, pallor, rash and wound  Allergic/Immunologic: Negative for food allergies and immunocompromised state  Neurological: Positive for headaches  Negative for dizziness, tremors, seizures, syncope, facial asymmetry, speech difficulty, weakness, light-headedness and numbness  Hematological: Negative for adenopathy  Does not bruise/bleed easily  Psychiatric/Behavioral: Negative for agitation, behavioral problems, confusion, decreased concentration, dysphoric mood, hallucinations, self-injury, sleep disturbance and suicidal ideas  The patient is not nervous/anxious and is not hyperactive  Past Medical History:     Past Medical History:   Diagnosis Date   • Diabetes mellitus ([facility] [address] )       Past Surgical History:     History reviewed  No pertinent surgical history     Social History:     Social History     Socioeconomic History   • Marital status: Single     Spouse name: None   • Number of children: None   • Years of education: None   • Highest education level: None   Occupational History   • None   Tobacco Use   • Smoking status: Never   • Smokeless tobacco: Never   Vaping Use   • Vaping Use: Never used   Substance and Sexual Activity   • Alcohol use: Yes     Comment: rare   • Drug use: No   • Sexual activity: Yes     Partners: Male   Other Topics Concern   • None   Social History Narrative   • None     Social Determinants of Health     Financial Resource Strain: Not on file   Food Insecurity: Not on file   Transportation Needs: Not on file   Physical Activity: Not on file   Stress: Not on file   Social Connections: Not on file   Intimate Partner Violence: Not on file   Housing Stability: Not on file      Family History:     Family History   Problem Relation Age of Onset   • Hypertension Maternal Grandmother    • Diabetes Cousin    • Substance Abuse Neg Hx    • Mental illness Neg Hx       Current Medications: Current Outpatient Medications   Medication Sig Dispense Refill   • amitriptyline (ELAVIL) 25 mg tablet Take 25 mg by mouth 3 (three) times a day     • Continuous Blood Gluc Sensor (Dexcom G6 Sensor) MISC      • Continuous Blood Gluc Transmit (Dexcom G6 Transmitter) MISC      • insulin aspart (NovoLOG) 100 units/mL injection Inject 5 Units under the skin 3 (three) times a day before meals 3 Units 0   • insulin glargine (LANTUS SOLOSTAR) injection pen 100 units/mL Inject 0 2 mL (20 Units total) under the skin daily 5 pen 0   • ketorolac (TORADOL) 10 mg tablet Take 1 tablet (10 mg total) by mouth every 6 (six) hours as needed for moderate pain 12 tablet 0   • NovoPen Echo JEANNETTE      • ONETOUCH VERIO test strip      • Ozempic, 1 MG/DOSE, 4 MG/3ML SOPN injection pen      • simvastatin (ZOCOR) 5 MG tablet      • SYNTHROID 75 MCG tablet        No current facility-administered medications for this visit  Allergies:     No Known Allergies   Physical Exam:     /71 (BP Location: Left arm, Patient Position: Sitting, Cuff Size: Standard)   Pulse 85   Temp 98 6 °F (37 °C) (Tympanic)   Wt 53 1 kg (117 lb)   SpO2 100%   BMI 20 73 kg/m²     Physical Exam  Vitals and nursing note reviewed  Constitutional:       General: She is not in acute distress  Appearance: Normal appearance  She is normal weight  She is not ill-appearing, toxic-appearing or diaphoretic  HENT:      Head: Normocephalic  Right Ear: Tympanic membrane, ear canal and external ear normal  There is no impacted cerumen  Left Ear: Tympanic membrane, ear canal and external ear normal  There is no impacted cerumen  Nose: Nose normal  No congestion or rhinorrhea  Mouth/Throat:      Mouth: Mucous membranes are moist       Pharynx: Oropharynx is clear  No oropharyngeal exudate or posterior oropharyngeal erythema  Eyes:      General: No scleral icterus  Right eye: No discharge  Left eye: No discharge  Extraocular Movements: Extraocular movements intact  Conjunctiva/sclera: Conjunctivae normal       Pupils: Pupils are equal, round, and reactive to light  Neck:      Vascular: No carotid bruit  Cardiovascular:      Rate and Rhythm: Normal rate and regular rhythm  Pulses: Normal pulses  Heart sounds: Normal heart sounds  No murmur heard  No friction rub  No gallop  Pulmonary:      Effort: Pulmonary effort is normal  No respiratory distress  Breath sounds: Normal breath sounds  No stridor  No wheezing, rhonchi or rales  Chest:      Chest wall: No tenderness  Abdominal:      General: Abdomen is flat  Bowel sounds are normal  There is no distension  Palpations: Abdomen is soft  There is no mass  Tenderness: There is no abdominal tenderness  There is no right CVA tenderness, left CVA tenderness, guarding or rebound  Hernia: No hernia is present  Musculoskeletal:         General: No swelling, tenderness, deformity or signs of injury  Normal range of motion  Cervical back: Normal range of motion and neck supple  No rigidity or tenderness  No muscular tenderness  Right lower leg: No edema  Left lower leg: No edema  Lymphadenopathy:      Cervical: No cervical adenopathy  Skin:     General: Skin is warm  Capillary Refill: Capillary refill takes less than 2 seconds  Coloration: Skin is not jaundiced or pale  Findings: No bruising, erythema, lesion or rash  Neurological:      General: No focal deficit present  Mental Status: She is alert and oriented to person, place, and time  Cranial Nerves: No cranial nerve deficit  Sensory: No sensory deficit  Motor: No weakness  Coordination: Coordination normal       Gait: Gait normal       Deep Tendon Reflexes: Reflexes normal    Psychiatric:         Mood and Affect: Mood normal          Behavior: Behavior normal          Thought Content:  Thought content normal  Judgment: Judgment normal           Angela Dean, [address] [place], [place] [id number]

## 2023-03-01 NOTE — PROGRESS NOTES
[facility]        NAME: Caity Esteban is a 52 y o  female  : 1975    MRN: [id number]  DATE: 2023  TIME: 3:32 PM    Assessment and Plan   Acute intractable tension-type headache [G44 201]  1  Acute intractable tension-type headache  ketorolac (TORADOL) 10 mg tablet      2  Type 1 diabetes mellitus with diabetic neuropathy (HCC)        3  Screening mammogram, encounter for  Mammo screening bilateral w 3d & cad      4  Annual physical exam        5  Acquired hypothyroidism        6  Hyperlipidemia associated with type 2 diabetes mellitus [facility])              Patient Instructions     Patient Instructions     Toradol as prescribed for intractable headache  Go to ED if pain severe  Call with problems/concerns    Wellness Visit for Adults   AMBULATORY CARE:   A wellness visit  is when you see your healthcare provider to get screened for health problems  Your healthcare provider will also give you advice on how to stay healthy  Write down your questions so you remember to ask them  Ask your healthcare provider how often you should have a wellness visit  What happens at a wellness visit:  Your healthcare provider will ask about your health, and your family history of health problems  This includes high blood pressure, heart disease, and cancer  He or she will ask if you have symptoms that concern you, if you smoke, and about your mood  You may also be asked about your intake of medicines, supplements, food, and alcohol  Any of the following may be done:  • Your weight  will be checked  Your height may also be checked so your body mass index (BMI) can be calculated  Your BMI shows if you are at a healthy weight  • Your blood pressure  and heart rate will be checked  Your temperature may also be checked  • Blood and urine tests  may be done  Blood tests may be done to check your cholesterol levels  Abnormal cholesterol levels increase your risk for heart disease and stroke   You may also need a blood or urine test to check for diabetes if you are at increased risk  Urine tests may be done to look for signs of an infection or kidney disease  • A physical exam  includes checking your heartbeat and lungs with a stethoscope  Your healthcare provider may also check your skin to look for sun damage  • Screening tests  may be recommended  A screening test is done to check for diseases that may not cause symptoms  The screening tests you may need depend on your age, gender, family history, and lifestyle habits  For example, colorectal screening may be recommended if you are 48years old or older  Screening tests you need if you are a woman:   • A Pap smear  is used to screen for cervical cancer  Pap smears are usually done every 3 to 5 years depending on your age  You may need them more often if you have had abnormal Pap smear test results in the past  Ask your healthcare provider how often you should have a Pap smear  • A mammogram  is an x-ray of your breasts to screen for breast cancer  Experts recommend mammograms every 2 years starting at age 48 years  You may need a mammogram at age 52 years or younger if you have an increased risk for breast cancer  Talk to your healthcare provider about when you should start having mammograms and how often you need them  Vaccines you may need:   • Get an influenza vaccine  every year  The influenza vaccine protects you from the flu  Several types of viruses cause the flu  The viruses change over time, so new vaccines are made each year  • Get a tetanus-diphtheria (Td) booster vaccine  every 10 years  This vaccine protects you against tetanus and diphtheria  Tetanus is a severe infection that may cause painful muscle spasms and lockjaw  Diphtheria is a severe bacterial infection that causes a thick covering in the back of your mouth and throat      • Get a human papillomavirus (HPV) vaccine  if you are female and aged 23 to 32 or male 23 to 21 and never received it  This vaccine protects you from HPV infection  HPV is the most common infection spread by sexual contact  HPV may also cause vaginal, penile, and anal cancers  • Get a pneumococcal vaccine  if you are aged 72 years or older  The pneumococcal vaccine is an injection given to protect you from pneumococcal disease  Pneumococcal disease is an infection caused by pneumococcal bacteria  The infection may cause pneumonia, meningitis, or an ear infection  • Get a shingles vaccine  if you are 60 or older, even if you have had shingles before  The shingles vaccine is an injection to protect you from the varicella-zoster virus  This is the same virus that causes chickenpox  Shingles is a painful rash that develops in people who had chickenpox or have been exposed to the virus  How to eat healthy:  My Plate is a model for planning healthy meals  It shows the types and amounts of foods that should go on your plate  Fruits and vegetables make up about half of your plate, and grains and protein make up the other half  A serving of dairy is included on the side of your plate  The amount of calories and serving sizes you need depends on your age, gender, weight, and height  Examples of healthy foods are listed below:  • Eat a variety of vegetables  such as dark green, red, and orange vegetables  You can also include canned vegetables low in sodium (salt) and frozen vegetables without added butter or sauces  • Eat a variety of fresh fruits , canned fruit in 100% juice, frozen fruit, and dried fruit  • Include whole grains  At least half of the grains you eat should be whole grains  Examples include whole-wheat bread, wheat pasta, brown rice, and whole-grain cereals such as oatmeal     • Eat a variety of protein foods such as seafood (fish and shellfish), lean meat, and poultry without skin (turkey and chicken)   Examples of lean meats include pork leg, shoulder, or tenderloin, and beef round, sirloin, tenderloin, and extra lean ground beef  Other protein foods include eggs and egg substitutes, beans, peas, soy products, nuts, and seeds  • Choose low-fat dairy products such as skim or 1% milk or low-fat yogurt, cheese, and cottage cheese  • Limit unhealthy fats  such as butter, hard margarine, and shortening  Exercise:  Exercise at least 30 minutes per day on most days of the week  Some examples of exercise include walking, biking, dancing, and swimming  You can also fit in more physical activity by taking the stairs instead of the elevator or parking farther away from stores  Include muscle strengthening activities 2 days each week  Regular exercise provides many health benefits  It helps you manage your weight, and decreases your risk for type 2 diabetes, heart disease, stroke, and high blood pressure  Exercise can also help improve your mood  Ask your healthcare provider about the best exercise plan for you  General health and safety guidelines:   • Do not smoke  Nicotine and other chemicals in cigarettes and cigars can cause lung damage  Ask your healthcare provider for information if you currently smoke and need help to quit  E-cigarettes or smokeless tobacco still contain nicotine  Talk to your healthcare provider before you use these products  • Limit alcohol  A drink of alcohol is 12 ounces of beer, 5 ounces of wine, or 1½ ounces of liquor  • Lose weight, if needed  Being overweight increases your risk of certain health conditions  These include heart disease, high blood pressure, type 2 diabetes, and certain types of cancer  • Protect your skin  Do not sunbathe or use tanning beds  Use sunscreen with a SPF 15 or higher  Apply sunscreen at least 15 minutes before you go outside  Reapply sunscreen every 2 hours  Wear protective clothing, hats, and sunglasses when you are outside  • Drive safely  Always wear your seatbelt   Make sure everyone in your car wears a seatbelt  A seatbelt can save your life if you are in an accident  Do not use your cell phone when you are driving  This could distract you and cause an accident  Pull over if you need to make a call or send a text message  • Practice safe sex  Use latex condoms if are sexually active and have more than one partner  Your healthcare provider may recommend screening tests for sexually transmitted infections (STIs)  • Wear helmets, lifejackets, and protective gear  Always wear a helmet when you ride a bike or motorcycle, go skiing, or play sports that could cause a head injury  Wear protective equipment when you play sports  Wear a lifejacket when you are on a boat or doing water sports  © Copyright Samuel Hoffman 2022 Information is for End User's use only and may not be sold, redistributed or otherwise used for commercial purposes  The above information is an  only  It is not intended as medical advice for individual conditions or treatments  Talk to your doctor, nurse or pharmacist before following any medical regimen to see if it is safe and effective for you  Chief Complaint     Chief Complaint   Patient presents with   • Headache     Severe for past couple of days   • Physical Exam         History of Present Illness       C/O headache that began on Sunday off and on and has been continuous since then  Usually if she gets a headache its related to caffeine but this is different  She tried ibuprofen/aleve/tylenol and caffeine and no relief  Is not associated with light, sound, smells  Tried to sleep  Moving is causing pain  Pain 10/10 at present  had menstrual cycle which ended on Sunday the day headache began- reports BS is under control     [facility]  hyperlipidemia associated with type 2 DM on simvastatin 5mg, ozempic and insulin-followed by [facility]        Review of Systems   Review of Systems   Constitutional: Negative for activity change, diaphoresis, fatigue and fever    HENT: Negative for congestion, ear pain, facial swelling, hearing loss, rhinorrhea, sinus pressure, sinus pain, sneezing, sore throat and voice change  Eyes: Negative for photophobia, pain, discharge and visual disturbance  Respiratory: Negative for cough, choking, chest tightness, shortness of breath, wheezing and stridor  Cardiovascular: Negative for chest pain, palpitations and leg swelling  Gastrointestinal: Negative for abdominal distention, abdominal pain, constipation, diarrhea, nausea and vomiting  Endocrine: Negative for polydipsia, polyphagia and polyuria  Genitourinary: Negative for difficulty urinating, dysuria, frequency, menstrual problem and urgency  Musculoskeletal: Negative for arthralgias, back pain, gait problem, joint swelling, myalgias, neck pain and neck stiffness  Skin: Negative for color change, rash and wound  Neurological: Positive for headaches  Negative for dizziness, tremors, seizures, syncope, facial asymmetry, speech difficulty, weakness, light-headedness and numbness  Hematological: Negative for adenopathy  Does not bruise/bleed easily  Psychiatric/Behavioral: Negative for agitation, behavioral problems, confusion, dysphoric mood, hallucinations, sleep disturbance and suicidal ideas  The patient is not nervous/anxious            Current Medications       Current Outpatient Medications:   •  amitriptyline (ELAVIL) 25 mg tablet, Take 25 mg by mouth 3 (three) times a day, Disp: , Rfl:   •  Continuous Blood Gluc Sensor (Dexcom G6 Sensor) MISC, , Disp: , Rfl:   •  Continuous Blood Gluc Transmit (Dexcom G6 Transmitter) MISC, , Disp: , Rfl:   •  insulin aspart (NovoLOG) 100 units/mL injection, Inject 5 Units under the skin 3 (three) times a day before meals, Disp: 3 Units, Rfl: 0  •  insulin glargine (LANTUS SOLOSTAR) injection pen 100 units/mL, Inject 0 2 mL (20 Units total) under the skin daily, Disp: 5 pen, Rfl: 0  •  ketorolac (TORADOL) 10 mg tablet, Take 1 tablet (10 mg total) by mouth every 6 (six) hours as needed for moderate pain, Disp: 12 tablet, Rfl: 0  •  NovoPen Echo JEANNETTE, , Disp: , Rfl:   •  ONETOUCH VERIO test strip, , Disp: , Rfl:   •  Ozempic, 1 MG/DOSE, 4 MG/3ML SOPN injection pen, , Disp: , Rfl:   •  simvastatin (ZOCOR) 5 MG tablet, , Disp: , Rfl:   •  SYNTHROID 75 MCG tablet, , Disp: , Rfl:     Current Allergies     Allergies as of 03/01/2023   • (No Known Allergies)            The following portions of the patient's history were reviewed and updated as appropriate: allergies, current medications, past family history, past medical history, past social history, past surgical history and problem list      Past Medical History:   Diagnosis Date   • Diabetes mellitus ([facility] )        History reviewed  No pertinent surgical history  Family History   Problem Relation Age of Onset   • Hypertension Maternal Grandmother    • Diabetes Cousin    • Substance Abuse Neg Hx    • Mental illness Neg Hx          Medications have been verified  Objective   /71 (BP Location: Left arm, Patient Position: Sitting, Cuff Size: Standard)   Pulse 85   Temp 98 6 °F (37 °C) (Tympanic)   Wt 53 1 kg (117 lb)   SpO2 100%   BMI 20 73 kg/m²        Physical Exam     Physical Exam  Vitals and nursing note reviewed  Constitutional:       General: She is not in acute distress  Appearance: Normal appearance  She is well-developed and normal weight  She is not ill-appearing, toxic-appearing or diaphoretic  HENT:      Head: Normocephalic and atraumatic  Comments: Occipital tenderness,      Right Ear: Tympanic membrane, ear canal and external ear normal  There is no impacted cerumen  Left Ear: Tympanic membrane, ear canal and external ear normal  Left ear middle ear effusion: fluis in B/L tm  There is no impacted cerumen  Nose: Nose normal  No congestion or rhinorrhea  Right Sinus: No maxillary sinus tenderness or frontal sinus tenderness        Left Sinus: No maxillary sinus tenderness or frontal sinus tenderness  Mouth/Throat:      Mouth: Mucous membranes are moist       Pharynx: Uvula midline  No oropharyngeal exudate or posterior oropharyngeal erythema  Eyes:      General:         Right eye: No discharge  Left eye: No discharge  Extraocular Movements: Extraocular movements intact  Conjunctiva/sclera: Conjunctivae normal       Pupils: Pupils are equal, round, and reactive to light  Neck:      Thyroid: No thyromegaly  Vascular: No carotid bruit  Trachea: No tracheal deviation  Cardiovascular:      Rate and Rhythm: Normal rate and regular rhythm  Heart sounds: Normal heart sounds  No murmur heard  No friction rub  No gallop  Pulmonary:      Effort: Pulmonary effort is normal  No respiratory distress  Breath sounds: Normal breath sounds  No stridor  No wheezing, rhonchi or rales  Chest:      Chest wall: No tenderness  Abdominal:      General: Bowel sounds are normal  There is no distension  Palpations: Abdomen is soft  There is no mass  Tenderness: There is no abdominal tenderness  There is no guarding or rebound  Hernia: No hernia is present  Musculoskeletal:         General: No swelling, tenderness or deformity  Normal range of motion  Cervical back: Normal range of motion and neck supple  No rigidity or tenderness  Right lower leg: No edema  Lymphadenopathy:      Cervical: No cervical adenopathy  Skin:     General: Skin is warm and dry  Capillary Refill: Capillary refill takes less than 2 seconds  Coloration: Skin is not jaundiced or pale  Findings: No bruising, erythema, lesion or rash  Neurological:      General: No focal deficit present  Mental Status: She is alert and oriented to person, place, and time  Cranial Nerves: No cranial nerve deficit  Sensory: No sensory deficit  Motor: No weakness        Coordination: Coordination normal       Gait: Gait normal       Deep Tendon Reflexes: Reflexes normal    Psychiatric:         Mood and Affect: Mood normal          Speech: Speech normal          Behavior: Behavior normal          Thought Content:  Thought content normal          Judgment: Judgment normal

## 2023-03-08 ENCOUNTER — TELEPHONE (OUTPATIENT)
Dept: ADMINISTRATIVE | Facility: OTHER | Age: 48
End: 2023-03-08

## 2023-03-08 NOTE — LETTER
Diabetic Foot Exam Form    Date Requested: 23  Patient: Jerilyn Flood  Patient : 1975   Referring Provider: Brian Eisenberg MD    Diabetic Foot Exam Performed with shoes and socks removed        Yes         No     Date of Diabetic Foot Exam ______________________________  Risk Score ____________________________________________    Left Foot       Visual Inspection         Monofilament Testing Sensory Exam        Pedal Pulses         Additional Comments         Right Foot      Visual Inspection         Monofilament Testing Sensory Exam       Pedal Pulses         Additional Comments         Comments __________________________________________________________    Practice Providing Exam ______________________________________________    Exam Performed By (print name) _______________________________________      Provider Signature ___________________________________________________      These reports are needed for  compliance  Please fax this completed form and a copy of the Diabetic Foot Exam report to our office located at [place] [address] as soon as possible via Fax [phone] mayra Acevedo: Phone [phone]    We thank you for your assistance in treating our mutual patient

## 2023-03-08 NOTE — LETTER
Diabetic Foot Exam Form    Date Requested: 23  Patient: Nery Sidhu  Patient : 1975   Referring Provider: Prisca Elder MD    Diabetic Foot Exam Performed with shoes and socks removed        Yes         No     Date of Diabetic Foot Exam ______________________________  Risk Score ____________________________________________    Left Foot       Visual Inspection         Monofilament Testing Sensory Exam        Pedal Pulses         Additional Comments         Right Foot      Visual Inspection         Monofilament Testing Sensory Exam       Pedal Pulses         Additional Comments         Comments __________________________________________________________    Practice Providing Exam ______________________________________________    Exam Performed By (print name) _______________________________________      Provider Signature ___________________________________________________      These reports are needed for  compliance  Please fax this completed form and a copy of the Diabetic Foot Exam report to our office located at [place] [address] as soon as possible via Fax [phone] mayra Bowman: Phone [phone]    We thank you for your assistance in treating our mutual patient

## 2023-03-08 NOTE — LETTER
Diabetic Foot Exam Form    Date Requested: 23  Patient: Nery Segundo  Patient : 1975   Referring Provider: Jolinda Klinefelter, MD    Diabetic Foot Exam Performed with shoes and socks removed        Yes         No     Date of Diabetic Foot Exam ______________________________  Risk Score ____________________________________________    Left Foot       Visual Inspection         Monofilament Testing Sensory Exam        Pedal Pulses         Additional Comments         Right Foot      Visual Inspection         Monofilament Testing Sensory Exam       Pedal Pulses         Additional Comments         Comments __________________________________________________________    Practice Providing Exam ______________________________________________    Exam Performed By (print name) _______________________________________      Provider Signature ___________________________________________________      These reports are needed for  compliance  Please fax this completed form and a copy of the Diabetic Foot Exam report to our office located at [place] [address] as soon as possible via Fax [phone] mayra Floyd: Phone [phone]    We thank you for your assistance in treating our mutual patient

## 2023-03-08 NOTE — TELEPHONE ENCOUNTER
Upon review of the In Basket request and the patient's chart, initial outreach has been made via fax to facility  Please see Contacts section for details       Thank you  Tristin Salazar MA

## 2023-03-08 NOTE — TELEPHONE ENCOUNTER
----- Message from [facility] sent at 3/7/2023  2:07 PM EST -----  Regarding: Care Gap Request DM FOOT  03/07/23 2:07 PM    Hello, our patient attached above has had Diabetic Foot Exam completed/performed  Please assist in updating the patient chart by making an External outreach to [address] [place] [address] located in [place]   The date of service is 01/23/2023    Latrice Loredo MD (Endocrinology, Diabetes & Metabolism)  [phone] (Work)  [phone] (Fax)  [address], [place] [address]  Endocrinology  [address] [address]    Thank you,  Angelita Ness  [facility]

## 2023-03-13 NOTE — TELEPHONE ENCOUNTER
As a follow-up, a second attempt has been made for outreach via fax to facility  Please see Contacts section for details      Thank you  Jeff Thomason MA

## 2023-03-17 NOTE — TELEPHONE ENCOUNTER
As a final attempt, a third outreach has been made via telephone call to facility  Please see Contacts section for details  This encounter will be closed and completed by end of day  Should we receive the requested information because of previous outreach attempts, the requested patient's chart will be updated appropriately       Thank you  Derick Knott MA

## 2023-06-28 ENCOUNTER — TELEPHONE (OUTPATIENT)
Dept: ADMINISTRATIVE | Facility: OTHER | Age: 48
End: 2023-06-28

## 2023-06-28 NOTE — TELEPHONE ENCOUNTER
----- Message from Veola Hammans sent at 6/27/2023 10:14 AM EDT -----  Regarding: Diabetic Eye Exam  06/27/23 10:15 AM    Hello, our patient Ronny Morales has had Diabetic Eye Exam completed/performed. Please assist in updating the patient chart by making an External outreach to [facility] located in [place], [place]. The date of service is 02/2023.       Thank you,  Sophie Avila [facility]

## 2023-06-28 NOTE — LETTER
Diabetic Eye Exam Form    Date Requested: 23  Patient: Zenaida Weiner  Patient : 1975   Referring Provider: Lorenzo Coleman MD      DIABETIC Eye Exam Date _______________________________      Type of Exam MUST be documented for Diabetic Eye Exams. Please CHECK ONE. Retinal Exam       Dilated Retinal Exam       OCT       Optomap-Iris Exam      Fundus Photography       Left Eye - Please check Retinopathy or No Retinopathy        Exam did show retinopathy    Exam did not show retinopathy       Right Eye - Please check Retinopathy or No Retinopathy       Exam did show retinopathy    Exam did not show retinopathy       Comments __________________________________________________________    Practice Providing Exam ______________________________________________    Exam Performed By (print name) _______________________________________      Provider Signature ___________________________________________________      These reports are needed for  compliance. Please fax this completed form and a copy of the Diabetic Eye Exam report to our office located at [address] [place] [address] as soon as possible via Fax [phone] mayra Bay Adames: Phone [phone]  We thank you for your assistance in treating our mutual patient.

## 2023-06-28 NOTE — LETTER
Diabetic Eye Exam Form    Date Requested: 23  Patient: Dexter Ray  Patient : 1975   Referring Provider: Valery Montes De Oca MD      DIABETIC Eye Exam Date _______________________________      Type of Exam MUST be documented for Diabetic Eye Exams. Please CHECK ONE. Retinal Exam       Dilated Retinal Exam       OCT       Optomap-Iris Exam      Fundus Photography       Left Eye - Please check Retinopathy or No Retinopathy        Exam did show retinopathy    Exam did not show retinopathy       Right Eye - Please check Retinopathy or No Retinopathy       Exam did show retinopathy    Exam did not show retinopathy       Comments __________________________________________________________    Practice Providing Exam ______________________________________________    Exam Performed By (print name) _______________________________________      Provider Signature ___________________________________________________      These reports are needed for  compliance. Please fax this completed form and a copy of the Diabetic Eye Exam report to our office located at [address] [place] [address] as soon as possible via Fax [phone] attention Rafa Jainple: Phone [phone]  We thank you for your assistance in treating our mutual patient.

## 2023-06-28 NOTE — TELEPHONE ENCOUNTER
Upon review of the In Basket request and the patient's chart, initial outreach has been made via fax to facility. Please see Contacts section for details.      Thank you  Uma Chopra

## 2023-07-03 NOTE — TELEPHONE ENCOUNTER
As a follow-up, a second attempt has been made for outreach via fax to facility. Please see Contacts section for details.     Thank you  Brooke Arguello

## 2023-07-05 NOTE — TELEPHONE ENCOUNTER
Upon review of the In Basket request we were able to locate, review, and update the patient chart as requested for Diabetic Eye Exam.    Any additional questions or concerns should be emailed to the Practice Liaisons via the appropriate education email address, please do not reply via In Basket.     Thank you  Markie Fitzgerald

## 2024-05-22 ENCOUNTER — TELEPHONE (OUTPATIENT)
Age: 49
End: 2024-05-22

## 2024-05-22 NOTE — TELEPHONE ENCOUNTER
Patient is requesting an insurance referral for the following specialty:      Test Name / Order Name: Endocrinologist     DX Code: N/A    Date Of Service: 02/13/2024,     Location/Facility Name/Address/Phone #:   [facility] [address]  [address]  [place] [address]  [facility]    Phone  [phone]  Fax  [phone]    Location / Facility NPI: [id number]    [facility] Phone # To Reach The Patient:  [phone]

## 2024-08-09 ENCOUNTER — TELEPHONE (OUTPATIENT)
Age: 49
End: 2024-08-09

## 2024-08-09 NOTE — TELEPHONE ENCOUNTER
Patient is requesting an insurance referral for the following specialty:      Test Name / Order Name: Follow up    DX Code: did not provide    Date Of Service: 8/20    Location/Facility Name/Address/Phone #:   [facility]  [address] [facility]  [phone]  Fax [phone]    Location / Facility NPI: [id number]

## 2024-08-12 ENCOUNTER — TELEPHONE (OUTPATIENT)
Dept: FAMILY MEDICINE CLINIC | Facility: CLINIC | Age: 49
End: 2024-08-12

## 2024-08-12 DIAGNOSIS — R68.89 OTHER GENERAL SYMPTOMS AND SIGNS: Primary | ICD-10-CM

## 2024-08-13 NOTE — TELEPHONE ENCOUNTER
Patient called to follow up on referral request, informed patient that referral has been authorized, she will follow up with endocrinology at [facility]

## 2024-10-15 ENCOUNTER — OFFICE VISIT (OUTPATIENT)
Dept: FAMILY MEDICINE CLINIC | Facility: CLINIC | Age: 49
End: 2024-10-15
Payer: COMMERCIAL

## 2024-10-15 VITALS
SYSTOLIC BLOOD PRESSURE: 138 MMHG | HEART RATE: 79 BPM | BODY MASS INDEX: 19.49 KG/M2 | DIASTOLIC BLOOD PRESSURE: 78 MMHG | OXYGEN SATURATION: 99 % | WEIGHT: 110 LBS

## 2024-10-15 DIAGNOSIS — R13.10 DYSPHAGIA, UNSPECIFIED TYPE: ICD-10-CM

## 2024-10-15 DIAGNOSIS — Z12.11 SCREEN FOR COLON CANCER: ICD-10-CM

## 2024-10-15 DIAGNOSIS — R79.89 ELEVATED SERUM CREATININE: ICD-10-CM

## 2024-10-15 DIAGNOSIS — Z12.31 ENCOUNTER FOR SCREENING MAMMOGRAM FOR BREAST CANCER: ICD-10-CM

## 2024-10-15 DIAGNOSIS — Z00.00 WELLNESS EXAMINATION: Primary | ICD-10-CM

## 2024-10-15 DIAGNOSIS — E03.9 ACQUIRED HYPOTHYROIDISM: ICD-10-CM

## 2024-10-15 DIAGNOSIS — E78.5 HYPERLIPIDEMIA ASSOCIATED WITH TYPE 2 DIABETES MELLITUS  (HCC): ICD-10-CM

## 2024-10-15 DIAGNOSIS — E11.69 HYPERLIPIDEMIA ASSOCIATED WITH TYPE 2 DIABETES MELLITUS  (HCC): ICD-10-CM

## 2024-10-15 DIAGNOSIS — E10.40 TYPE 1 DIABETES MELLITUS WITH DIABETIC NEUROPATHY (HCC): ICD-10-CM

## 2024-10-15 PROCEDURE — 99396 PREV VISIT EST AGE 40-64: CPT

## 2024-10-15 RX ORDER — INSULIN PMP CART,AUT,G6/7,CNTR
EACH SUBCUTANEOUS
COMMUNITY
Start: 2023-03-15

## 2024-10-15 RX ORDER — INSULIN ASPART 100 [IU]/ML
INJECTION, SOLUTION INTRAVENOUS; SUBCUTANEOUS
COMMUNITY
Start: 2024-07-18

## 2024-10-15 NOTE — PROGRESS NOTES
Adult Annual Physical  Name: Flory Shook      : 1975      MRN: [id number]  Encounter Provider: BEST Reid  Encounter Date: 10/15/2024   Encounter department: [facility]    Assessment & Plan  Acquired hypothyroidism  Follows with [facility] endo. Check TSH and T4.   Orders:    CBC and differential; Future    T4, free; Future    TSH, 3rd generation; Future    CBC and differential    T4, free    TSH, 3rd generation    Hyperlipidemia associated with type 2 diabetes mellitus  (HCC)  Continue simvastatin 5 mg. Monitor lipid panel.  Lab Results   Component Value Date    HGBA1C 6.8 (H) 2024       Orders:    Lipid Panel with Direct LDL reflex; Future    Lipid Panel with Direct LDL reflex    Type 1 diabetes mellitus with diabetic neuropathy (HCC)  Follows with [facility] endo. Worsening A1C from 6.0 to 6.8. Patient states that she was not expecting her A1C to be elevated. Utilizes insulin pump and CGM.   Lab Results   Component Value Date    HGBA1C 6.8 (H) 2024       Orders:    CBC and differential; Future    Comprehensive metabolic panel; Future    Hemoglobin A1C With EAG; Future    Microalbumin, urine, 24 hour; Future    CBC and differential    Comprehensive metabolic panel    Hemoglobin A1C With EAG    Microalbumin, urine, 24 hour    Elevated serum creatinine  Last creatinine 1.00 and GFR 69. In . GFR was 80. Monitor CMP.  Orders:    Comprehensive metabolic panel; Future    Comprehensive metabolic panel    Dysphagia, unspecified type  Reports that food often gets stuck in her throat and she must vomit to get it out. Reports when this happens liquid will not pass either.   Orders:    Ambulatory Referral to Gastroenterology; Future    Screen for colon cancer  Never had colon ca screening.   Orders:    Ambulatory Referral to Gastroenterology; Future    Encounter for screening mammogram for breast cancer    Orders:    Mammo screening bilateral w 3d and cad;  Future    Wellness examination         Immunizations and preventive care screenings were discussed with patient today. Appropriate education was printed on patient's after visit summary.    Counseling:  Exercise: the importance of regular exercise/physical activity was discussed. Recommend exercise 3-5 times per week for at least 30 minutes.       Depression Screening and Follow-up Plan: Patient was screened for depression during today's encounter. They screened negative with a PHQ-2 score of 1.        History of Present Illness     Adult Annual Physical:  Patient presents for annual physical.     Diet and Physical Activity:  - Diet/Nutrition: well balanced diet. appetite waxes and wanes  - Exercise: no formal exercise.    Depression Screening:  - PHQ-2 Score: 1    General Health:  - Sleep: > 8 hours of sleep on average.  - Hearing: normal hearing bilateral ears.  - Vision: wears glasses and goes for regular eye exams.  - Dental: regular dental visits and brushes teeth twice daily.    /GYN Health:  - Follows with GYN: no.     Advanced Care Planning:  - Has an advanced directive?: no    - Has a durable medical POA?: no    - ACP document given to patient?: no      Review of Systems   Constitutional:  Negative for activity change, fatigue and fever.   HENT:  Negative for congestion, ear pain, rhinorrhea and sore throat.    Eyes:  Negative for pain.   Respiratory:  Negative for cough, shortness of breath and wheezing.    Cardiovascular:  Negative for chest pain and leg swelling.   Gastrointestinal:  Negative for abdominal pain, diarrhea, nausea and vomiting.   Musculoskeletal:  Negative for arthralgias and myalgias.   Skin:  Negative for rash.   Neurological:  Negative for dizziness, weakness and numbness.   All other systems reviewed and are negative.    Medical History Reviewed by provider this encounter:  Tobacco  Allergies  Meds  Problems  Med Hx  Surg Hx  Fam Hx       Current Outpatient Medications on File  Prior to Visit   Medication Sig Dispense Refill    amitriptyline (ELAVIL) 25 mg tablet Take 25 mg by mouth 3 (three) times a day      Continuous Blood Gluc Sensor (Dexcom G6 Sensor) MISC       Continuous Blood Gluc Transmit (Dexcom G6 Transmitter) MISC       insulin aspart (NovoLOG) 100 units/mL injection Inject 5 Units under the skin 3 (three) times a day before meals 3 Units 0    Insulin Disposable Pump (Omnipod 5 G6 Pods, Gen 5,) MISC       NovoLOG 100 UNIT/ML injection Inject under the skin 3 (three) times a day with meals      ONETOUCH VERIO test strip       Ozempic, 1 MG/DOSE, 4 MG/3ML SOPN injection pen       simvastatin (ZOCOR) 5 MG tablet       SYNTHROID 75 MCG tablet       [DISCONTINUED] NovoPen Echo JEANNETTE        No current facility-administered medications on file prior to visit.        Objective     /78 (BP Location: Left arm, Patient Position: Sitting, Cuff Size: Standard)   Pulse 79   Wt 49.9 kg (110 lb)   SpO2 99%   BMI 19.49 kg/m²     Physical Exam  Vitals and nursing note reviewed.   Constitutional:       General: She is not in acute distress.     Appearance: Normal appearance. She is well-developed. She is not ill-appearing.   HENT:      Head: Normocephalic and atraumatic.      Right Ear: Tympanic membrane, ear canal and external ear normal. There is no impacted cerumen.      Left Ear: Tympanic membrane, ear canal and external ear normal. There is no impacted cerumen.      Nose: Nose normal. No congestion.      Mouth/Throat:      Mouth: Mucous membranes are moist.      Pharynx: No oropharyngeal exudate.   Cardiovascular:      Rate and Rhythm: Normal rate and regular rhythm.      Pulses: no weak pulses.           Dorsalis pedis pulses are 2+ on the right side and 2+ on the left side.        Posterior tibial pulses are 2+ on the right side and 2+ on the left side.      Heart sounds: Normal heart sounds. No murmur heard.  Pulmonary:      Effort: Pulmonary effort is normal. No respiratory  distress.      Breath sounds: Normal breath sounds.   Abdominal:      General: Bowel sounds are normal. There is no distension.      Palpations: Abdomen is soft.      Tenderness: There is no abdominal tenderness.   Musculoskeletal:         General: No swelling.      Cervical back: Neck supple.   Feet:      Right foot:      Skin integrity: No ulcer, skin breakdown, erythema, warmth, callus or dry skin.      Left foot:      Skin integrity: No ulcer, skin breakdown, erythema, warmth, callus or dry skin.   Skin:     General: Skin is warm and dry.      Capillary Refill: Capillary refill takes less than 2 seconds.   Neurological:      Mental Status: She is alert and oriented to person, place, and time. Mental status is at baseline.   Psychiatric:         Mood and Affect: Mood normal.         Behavior: Behavior normal.     Diabetic Foot Exam    Patient's shoes and socks removed.    Right Foot/Ankle   Right Foot Inspection  Skin Exam: skin normal and skin intact. No dry skin, no warmth, no callus, no erythema, no maceration, no abnormal color, no pre-ulcer, no ulcer and no callus.     Toe Exam: ROM and strength within normal limits.     Sensory   Vibration: intact  Monofilament testing: intact    Vascular  Capillary refills: < 3 seconds  The right DP pulse is 2+. The right PT pulse is 2+.     Left Foot/Ankle  Left Foot Inspection  Skin Exam: skin normal and skin intact. No dry skin, no warmth, no erythema, no maceration, normal color, no pre-ulcer, no ulcer and no callus.     Toe Exam: ROM and strength within normal limits.     Sensory   Vibration: intact  Monofilament testing: intact    Vascular  Capillary refills: < 3 seconds  The left DP pulse is 2+. The left PT pulse is 2+.     Assign Risk Category  No deformity present  Loss of protective sensation  No weak pulses  Risk: 1    Administrative Statements   I have spent a total time of 30 minutes in caring for this patient on the day of the visit/encounter including  Diagnostic results, Risks and benefits of tx options, Instructions for management, Patient and family education, Importance of tx compliance, Risk factor reductions, Impressions, Documenting in the medical record, Reviewing / ordering tests, medicine, procedures  , and Obtaining or reviewing history  .

## 2024-10-17 NOTE — ASSESSMENT & PLAN NOTE
Follows with [facility] endo. Worsening A1C from 6.0 to 6.8. Patient states that she was not expecting her A1C to be elevated. Utilizes insulin pump and CGM.   Lab Results   Component Value Date    HGBA1C 6.8 (H) 08/09/2024       Orders:    CBC and differential; Future    Comprehensive metabolic panel; Future    Hemoglobin A1C With EAG; Future    Microalbumin, urine, 24 hour; Future    CBC and differential    Comprehensive metabolic panel    Hemoglobin A1C With EAG    Microalbumin, urine, 24 hour

## 2024-10-17 NOTE — ASSESSMENT & PLAN NOTE
Follows with [facility] endo. Check TSH and T4.   Orders:    CBC and differential; Future    T4, free; Future    TSH, 3rd generation; Future    CBC and differential    T4, free    TSH, 3rd generation

## 2024-10-17 NOTE — ASSESSMENT & PLAN NOTE
Continue simvastatin 5 mg. Monitor lipid panel.  Lab Results   Component Value Date    HGBA1C 6.8 (H) 08/09/2024       Orders:    Lipid Panel with Direct LDL reflex; Future    Lipid Panel with Direct LDL reflex

## 2024-10-21 ENCOUNTER — HOSPITAL ENCOUNTER (OUTPATIENT)
Dept: RADIOLOGY | Facility: HOSPITAL | Age: 49
Discharge: HOME | End: 2024-10-21
Payer: COMMERCIAL

## 2024-10-21 ENCOUNTER — TELEPHONE (OUTPATIENT)
Age: 49
End: 2024-10-21

## 2024-10-21 DIAGNOSIS — M79.672 LEFT FOOT PAIN: ICD-10-CM

## 2024-10-21 DIAGNOSIS — M79.672 LEFT FOOT PAIN: Primary | ICD-10-CM

## 2024-10-21 PROCEDURE — 73630 X-RAY EXAM OF FOOT: CPT

## 2024-10-22 ENCOUNTER — TELEPHONE (OUTPATIENT)
Dept: FAMILY MEDICINE CLINIC | Facility: CLINIC | Age: 49
End: 2024-10-22

## 2024-10-22 ENCOUNTER — TELEPHONE (OUTPATIENT)
Age: 49
End: 2024-10-22

## 2024-10-22 DIAGNOSIS — S92.503A CLOSED FRACTURE OF PHALANX OF FIFTH TOE: Primary | ICD-10-CM

## 2024-10-22 NOTE — RESULT ENCOUNTER NOTE
"Please notify patient:     XR showed mildly displaced fracture of her little toe. Typically we recommend \"pantera taping\" the toe. Tape the injured toe to uninjured toe next to it. Essentially that uninjured toe acts like a splint. Would recommend padding in between the toes. Would recommend a post op shoe--- just a lightweight shoe with an adjustable strap. I placed on order that she should be able to get from [email or web address] or can  printed copy in office, and bring to a medical supply company or a pharmacy that carries medical supplies. These shoes can also be purchased on [employer]. I placed an ortho referral in the event that her symptoms worsen/persist but typically ortho not required for this injury. If she would like to follow up with ortho # [phone]. "

## 2024-10-22 NOTE — TELEPHONE ENCOUNTER
"Spoke with pt she is aware and understands results. She also understands advice by Stacia. States that she is taping her toe and that the first day it was painful but now not bothering her as much. She understands that if needed she can follow up with ortho. States she will wait a week and monitor symptoms before possibly seeing ortho.    ----- Message from BEST Palumbo sent at 10/22/2024 10:57 AM EDT -----  Please notify patient:     XR showed mildly displaced fracture of her little toe. Typically we recommend \"pantera taping\" the toe. Tape the injured toe to uninjured toe next to it. Essentially that uninjured toe acts like a splint. Would recommend padding in between the toes. Would recommend a post op shoe--- just a lightweight shoe with an adjustable strap. I placed on order that she should be able to get from [employer] or can  printed copy in office, and bring to a medical supply company or a pharmacy that carries medical supplies. These shoes can also be purchased on [employer]. I placed an ortho referral in the event that her symptoms worsen/persist but typically ortho not required for this injury. If she would like to follow up with ortho # [phone].   "

## 2024-10-22 NOTE — TELEPHONE ENCOUNTER
Patient called and asked if a provider would review her xray results from yesterday, place appropriate referrals and call her with recommendations/next steps.

## 2024-11-07 ENCOUNTER — OFFICE VISIT (OUTPATIENT)
Dept: OBGYN CLINIC | Facility: CLINIC | Age: 49
End: 2024-11-07
Payer: COMMERCIAL

## 2024-11-07 VITALS
BODY MASS INDEX: 19.84 KG/M2 | HEIGHT: 63 IN | DIASTOLIC BLOOD PRESSURE: 70 MMHG | WEIGHT: 112 LBS | SYSTOLIC BLOOD PRESSURE: 120 MMHG

## 2024-11-07 DIAGNOSIS — S92.503A CLOSED FRACTURE OF PHALANX OF FIFTH TOE: ICD-10-CM

## 2024-11-07 PROCEDURE — 99203 OFFICE O/P NEW LOW 30 MIN: CPT | Performed by: ORTHOPAEDIC SURGERY

## 2024-11-07 NOTE — ASSESSMENT & PLAN NOTE
Findings consistent with left fifth minimally displaced oblique proximal phalanx fracture of small toe sustained 3 weeks ago. X-ray and prognosis reviewed with patient. Fracture will heal over next 4-6 weeks and pain will decrease as healing progresses. She will be placed in post op shoe to ambulate. She can transition to well supportive shoe prior to next appt if doing well in 4-6 weeks. Nsaids as needed for pain. All patient's questions were answered to her satisfaction.  This note is created using dictation transcription.  It may contain typographical errors, grammatical errors, improperly dictated words, background noise and other errors.

## 2024-11-07 NOTE — PROGRESS NOTES
Assessment:     1. Closed fracture of phalanx of fifth toe        Plan:     Problem List Items Addressed This Visit          Musculoskeletal and Integument    Closed fracture of phalanx of fifth toe     Findings consistent with left fifth minimally displaced oblique proximal phalanx fracture of small toe sustained 3 weeks ago. X-ray and prognosis reviewed with patient. Fracture will heal over next 4-6 weeks and pain will decrease as healing progresses. She will be placed in post op shoe to ambulate. She can transition to well supportive shoe prior to next appt if doing well in 4-6 weeks. Nsaids as needed for pain. All patient's questions were answered to her satisfaction.  This note is created using dictation transcription.  It may contain typographical errors, grammatical errors, improperly dictated words, background noise and other errors.         Relevant Orders    Post Op Shoe      Subjective:     Patient ID: Flory Shook is a 49 y.o. female.  Chief Complaint:  49 yr old female in for evaluation of left small toe proximal phalanx fracture sustained 3 weeks ago. Referred by BEST Palumbo. She jammed toe on crate on floor. Due to continued pain in toe she eventually saw her PCP and had imaging. She presents in low tide CAM boot which she had at home. She does have resolving swelling, bruising over small toe.    Allergy:  No Known Allergies  Medications:  all current active meds have been reviewed  Past Medical History:  Past Medical History:   Diagnosis Date    Diabetes mellitus (HCC)      Past Surgical History:  Past Surgical History:   Procedure Laterality Date    ANKLE SURGERY Right     plate and screws 25 yrs ago     Family History:  Family History   Problem Relation Age of Onset    Heart attack Mother     Hypertension Maternal Grandmother     Heart attack Maternal Grandfather     Diabetes Cousin     Substance Abuse Neg Hx     Mental illness Neg Hx      Social History:  Social History     Substance  "and Sexual Activity   Alcohol Use Yes    Comment: rare     Social History     Substance and Sexual Activity   Drug Use No     Social History     Tobacco Use   Smoking Status Never   Smokeless Tobacco Never     Review of Systems   Constitutional:  Negative for chills and fever.   HENT:  Negative for ear pain and sore throat.    Eyes:  Negative for pain and visual disturbance.   Respiratory:  Negative for cough and shortness of breath.    Cardiovascular:  Negative for chest pain and palpitations.   Gastrointestinal:  Negative for abdominal pain and vomiting.   Genitourinary:  Negative for dysuria and hematuria.   Musculoskeletal:  Positive for arthralgias (left foot) and joint swelling (Left small toe). Negative for back pain.   Skin:  Negative for color change and rash.   Neurological:  Negative for seizures and syncope.   Psychiatric/Behavioral: Negative.     All other systems reviewed and are negative.        Objective:  BP Readings from Last 1 Encounters:   11/07/24 120/70      Wt Readings from Last 1 Encounters:   11/07/24 50.8 kg (112 lb)      BMI:   Estimated body mass index is 19.84 kg/m² as calculated from the following:    Height as of this encounter: 5' 3\" (1.6 m).    Weight as of this encounter: 50.8 kg (112 lb).  BSA:   Estimated body surface area is 1.51 meters squared as calculated from the following:    Height as of this encounter: 5' 3\" (1.6 m).    Weight as of this encounter: 50.8 kg (112 lb).   Physical Exam  Vitals and nursing note reviewed.   Constitutional:       Appearance: Normal appearance. She is well-developed.   HENT:      Head: Normocephalic and atraumatic.      Right Ear: External ear normal.      Left Ear: External ear normal.   Eyes:      Extraocular Movements: Extraocular movements intact.      Conjunctiva/sclera: Conjunctivae normal.   Pulmonary:      Effort: Pulmonary effort is normal.   Musculoskeletal:         General: Tenderness (left foot/small toe arthralgia) present.      " Cervical back: Neck supple.   Skin:     General: Skin is warm and dry.   Neurological:      Mental Status: She is alert and oriented to person, place, and time.      Deep Tendon Reflexes: Reflexes are normal and symmetric.   Psychiatric:         Mood and Affect: Mood normal.         Behavior: Behavior normal.       Left Ankle Exam     Tenderness   Left ankle tenderness location: proximal phalanx of small toe.   Swelling: mild (proximal phalanx of small toe)    Range of Motion   The patient has normal left ankle ROM.     Muscle Strength   The patient has normal left ankle strength.    Tests   Anterior drawer: negative  Varus tilt: negative    Other   Erythema: absent  Scars: absent  Sensation: normal  Pulse: present            I have personally reviewed pertinent films in PACS and my interpretation is xr left foot minimally displaced oblique fracture of proximal phalanx.    Scribe Attestation      I,:  Yoel Pardo am acting as a scribe while in the presence of the attending physician.:       I,:  Ellen Mead MD personally performed the services described in this documentation    as scribed in my presence.:

## 2025-03-04 ENCOUNTER — OFFICE VISIT (OUTPATIENT)
Dept: GASTROENTEROLOGY | Facility: CLINIC | Age: 50
End: 2025-03-04
Payer: COMMERCIAL

## 2025-03-04 VITALS
HEIGHT: 63 IN | DIASTOLIC BLOOD PRESSURE: 68 MMHG | BODY MASS INDEX: 20.55 KG/M2 | SYSTOLIC BLOOD PRESSURE: 114 MMHG | WEIGHT: 116 LBS

## 2025-03-04 DIAGNOSIS — Z12.11 SCREEN FOR COLON CANCER: ICD-10-CM

## 2025-03-04 DIAGNOSIS — R13.10 DYSPHAGIA, UNSPECIFIED TYPE: ICD-10-CM

## 2025-03-04 DIAGNOSIS — K59.00 CONSTIPATION, UNSPECIFIED CONSTIPATION TYPE: Primary | ICD-10-CM

## 2025-03-04 PROCEDURE — 99204 OFFICE O/P NEW MOD 45 MIN: CPT | Performed by: INTERNAL MEDICINE

## 2025-03-04 RX ORDER — SODIUM CHLORIDE, SODIUM LACTATE, POTASSIUM CHLORIDE, CALCIUM CHLORIDE 600; 310; 30; 20 MG/100ML; MG/100ML; MG/100ML; MG/100ML
125 INJECTION, SOLUTION INTRAVENOUS CONTINUOUS
OUTPATIENT
Start: 2025-03-04

## 2025-03-04 RX ORDER — POLYETHYLENE GLYCOL 3350, SODIUM CHLORIDE, SODIUM BICARBONATE, POTASSIUM CHLORIDE 420; 11.2; 5.72; 1.48 G/4L; G/4L; G/4L; G/4L
4000 POWDER, FOR SOLUTION ORAL ONCE
Qty: 4000 ML | Refills: 0 | Status: SHIPPED | OUTPATIENT
Start: 2025-03-04 | End: 2025-03-04

## 2025-03-04 NOTE — PROGRESS NOTES
Name: Flory Shook      : 1975      MRN: [id number]  Encounter Provider: Marie Cortes MD  Encounter Date: 3/4/2025   Encounter department: [facility]    :  Assessment & Plan  Dysphagia, unspecified type  - Advised to abstain from carbonated beverages to see if symptoms improve  - Consider trial of reflux medication or endoscopy if symptoms persist  - Informed about silent reflux and esophageal cancer risk    Orders:    Ambulatory Referral to Gastroenterology    EGD; Future    Screen for colon cancer  - Due for screening  - Colonoscopy is the gold standard, with Cologuard as an alternative  - Advised to take MiraLAX before colonoscopy  Orders:    Ambulatory Referral to Gastroenterology    Colonoscopy; Future    polyethylene glycol-electrolytes (TriLyte) 4000 mL solution; Take 4,000 mL by mouth once for 1 dose Take 4000 mL by mouth once for 1 dose. Use as directed    Constipation, unspecified constipation type  - Advised to take MiraLAX before colonoscopy and aim for regular bowel movements  - MiraLAX is safe and non-absorbable, with diarrhea as the primary side effect  - Start with a low dose and adjust as needed               Results       Other orders    lactated ringers infusion      Chief Complaint   Patient presents with    Dysphagia     Feels like something gets stuck in her throat and gagging x 7 years,        History of Present Illness  The patient is a 49-year-old female presenting for evaluation of dysphagia and colorectal cancer screening.    Dysphagia  - The patient reports experiencing dysphagia, characterized by a sensation of obstruction in the throat, initially noted 7 years ago.  - These episodes occur approximately 5-6 times annually, typically when eating and speaking simultaneously.  - Associated symptoms include eructation and mild regurgitation, without accompanying pain or pyrosis.  - No esophagogastroduodenoscopy (EGD) or other  endoscopic evaluations have been performed.    Irregular Bowel Movements  - The patient reports irregular bowel movements, occurring once or twice weekly, which she attributes to her use of Ozempic and amitriptyline.  - She has been on Ozempic since  or .  - To manage this, she is attempting to increase her dietary fiber intake and is considering the use of polyethylene glycol (MiraLAX).  - She has a history of chronic constipation, similar to her mother's medical history.    Supplemental information: The patient occasionally takes ibuprofen 800 mg for cephalalgia. She has not attempted to eliminate carbonated beverages from her diet. She is currently on Synthroid and has a scheduled appointment with an endocrinologist in 2024.    SOCIAL HISTORY  - Does not drink alcohol    FAMILY HISTORY  - Mother  of a heart attack in 2024  - No family history of colon cancer    MEDICATIONS  - Current medications: Ozempic, amitriptyline, Synthroid, ibuprofen       Historical Information   Past Medical History:   Diagnosis Date    Diabetes mellitus (HCC)      Past Surgical History:   Procedure Laterality Date    ANKLE SURGERY Right     plate and screws 25 yrs ago     Social History     Substance and Sexual Activity   Alcohol Use Yes    Comment: rare     Social History     Substance and Sexual Activity   Drug Use No     Social History     Tobacco Use   Smoking Status Never   Smokeless Tobacco Never     Family History   Problem Relation Age of Onset    Heart attack Mother     Hypertension Maternal Grandmother     Heart attack Maternal Grandfather     Diabetes Cousin     Substance Abuse Neg Hx     Mental illness Neg Hx        Meds/Allergies     Current Outpatient Medications:     amitriptyline (ELAVIL) 25 mg tablet    Continuous Blood Gluc Sensor (Dexcom G6 Sensor) MISC    Continuous Blood Gluc Transmit (Dexcom G6 Transmitter) MISC    insulin aspart (NovoLOG) 100 units/mL injection    Insulin Disposable  "Pump (Omnipod 5 G6 Pods, Gen 5,) MISC    NovoLOG 100 UNIT/ML injection    ONETOUCH VERIO test strip    Ozempic, 1 MG/DOSE, 4 MG/3ML SOPN injection pen    polyethylene glycol-electrolytes (TriLyte) 4000 mL solution    simvastatin (ZOCOR) 5 MG tablet    SYNTHROID 75 MCG tablet  No Known Allergies    PHYSICAL EXAM:    Blood pressure 114/68, height 5' 3\" (1.6 m), weight 52.6 kg (116 lb). Body mass index is 20.55 kg/m².  Physical Exam      General Appearance: No apparent distress, cooperative, alert.  Eyes: Anicteric.  Gastrointestinal: Soft, non-tender, non-distended; normal bowel sounds; no masses, no organomegaly.    Rectal: Deferred.  Musculoskeletal: No edema.  Skin: No jaundice.     OTHER LAB RESULTS:   No results found for: \"WBC\", \"HGB\", \"MCV\", \"PLT\", \"INR\"  Lab Results   Component Value Date    K 4.2 08/09/2024     08/09/2024    CO2 28 08/09/2024    BUN 8 08/09/2024    CREATININE 1.00 (H) 08/09/2024    CALCIUM 9.4 08/09/2024    AST 14 08/09/2024    AST 15 01/24/2024    AST 13 07/28/2023    ALT 9 08/09/2024    ALT 9 01/24/2024    ALT 7 07/28/2023    ALKPHOS 44 08/09/2024    ALKPHOS 47 01/24/2024    ALKPHOS 42 07/28/2023    ALB 4.1 08/09/2024    TBILI 0.5 08/09/2024    TBILI 0.4 01/24/2024    TBILI 0.4 07/28/2023    EGFR 69 08/09/2024     No results found for: \"IRON\", \"TIBC\", \"FERRITIN\"  No results found for: \"LIPASE\"    OTHER RADIOLOGY RESULTS:   No results found.  "